# Patient Record
Sex: FEMALE | Race: WHITE | NOT HISPANIC OR LATINO | Employment: OTHER | URBAN - METROPOLITAN AREA
[De-identification: names, ages, dates, MRNs, and addresses within clinical notes are randomized per-mention and may not be internally consistent; named-entity substitution may affect disease eponyms.]

---

## 2017-04-13 ENCOUNTER — HOSPITAL ENCOUNTER (OUTPATIENT)
Dept: RADIOLOGY | Facility: CLINIC | Age: 82
Discharge: HOME | End: 2017-04-13
Payer: MEDICARE

## 2017-04-13 ENCOUNTER — ALLSCRIPTS OFFICE VISIT (OUTPATIENT)
Dept: OTHER | Facility: OTHER | Age: 82
End: 2017-04-13

## 2017-04-13 DIAGNOSIS — M25.519 PAIN IN SHOULDER: ICD-10-CM

## 2017-04-13 PROCEDURE — 73030 X-RAY EXAM OF SHOULDER: CPT

## 2017-07-13 ENCOUNTER — ALLSCRIPTS OFFICE VISIT (OUTPATIENT)
Dept: OTHER | Facility: OTHER | Age: 82
End: 2017-07-13

## 2018-01-12 VITALS
HEART RATE: 81 BPM | BODY MASS INDEX: 25.61 KG/M2 | DIASTOLIC BLOOD PRESSURE: 80 MMHG | SYSTOLIC BLOOD PRESSURE: 153 MMHG | HEIGHT: 64 IN | WEIGHT: 150 LBS

## 2018-01-13 VITALS
HEIGHT: 64 IN | DIASTOLIC BLOOD PRESSURE: 90 MMHG | HEART RATE: 87 BPM | BODY MASS INDEX: 24.45 KG/M2 | WEIGHT: 143.19 LBS | SYSTOLIC BLOOD PRESSURE: 153 MMHG

## 2019-06-14 ENCOUNTER — APPOINTMENT (EMERGENCY)
Dept: CT IMAGING | Facility: HOSPITAL | Age: 84
End: 2019-06-14
Payer: MEDICARE

## 2019-06-14 ENCOUNTER — APPOINTMENT (EMERGENCY)
Dept: RADIOLOGY | Facility: HOSPITAL | Age: 84
End: 2019-06-14
Payer: MEDICARE

## 2019-06-14 ENCOUNTER — HOSPITAL ENCOUNTER (EMERGENCY)
Facility: HOSPITAL | Age: 84
Discharge: HOME | End: 2019-06-14
Attending: EMERGENCY MEDICINE | Admitting: EMERGENCY MEDICINE
Payer: MEDICARE

## 2019-06-14 VITALS
HEIGHT: 64 IN | DIASTOLIC BLOOD PRESSURE: 80 MMHG | BODY MASS INDEX: 24.07 KG/M2 | RESPIRATION RATE: 17 BRPM | OXYGEN SATURATION: 94 % | HEART RATE: 87 BPM | TEMPERATURE: 98 F | WEIGHT: 141.01 LBS | SYSTOLIC BLOOD PRESSURE: 140 MMHG

## 2019-06-14 DIAGNOSIS — F03.90 DEMENTIA (HCC): ICD-10-CM

## 2019-06-14 DIAGNOSIS — R44.3 HALLUCINATIONS: Primary | ICD-10-CM

## 2019-06-14 LAB
ALBUMIN SERPL BCP-MCNC: 3.5 G/DL (ref 3.5–5)
ALP SERPL-CCNC: 65 U/L (ref 46–116)
ALT SERPL W P-5'-P-CCNC: 12 U/L (ref 12–78)
ANION GAP SERPL CALCULATED.3IONS-SCNC: 8 MMOL/L (ref 4–13)
AST SERPL W P-5'-P-CCNC: 21 U/L (ref 5–45)
BACTERIA UR QL AUTO: ABNORMAL /HPF
BASOPHILS # BLD AUTO: 0.04 THOUSANDS/ΜL (ref 0–0.1)
BASOPHILS NFR BLD AUTO: 0 % (ref 0–1)
BILIRUB SERPL-MCNC: 0.7 MG/DL (ref 0.2–1)
BILIRUB UR QL STRIP: NEGATIVE
BUN SERPL-MCNC: 16 MG/DL (ref 5–25)
CALCIUM SERPL-MCNC: 10.6 MG/DL (ref 8.3–10.1)
CHLORIDE SERPL-SCNC: 103 MMOL/L (ref 100–108)
CLARITY UR: CLEAR
CO2 SERPL-SCNC: 27 MMOL/L (ref 21–32)
COLOR UR: YELLOW
CREAT SERPL-MCNC: 0.91 MG/DL (ref 0.6–1.3)
EOSINOPHIL # BLD AUTO: 0.05 THOUSAND/ΜL (ref 0–0.61)
EOSINOPHIL NFR BLD AUTO: 1 % (ref 0–6)
ERYTHROCYTE [DISTWIDTH] IN BLOOD BY AUTOMATED COUNT: 14.4 % (ref 11.6–15.1)
GFR SERPL CREATININE-BSD FRML MDRD: 55 ML/MIN/1.73SQ M
GLUCOSE SERPL-MCNC: 127 MG/DL (ref 65–140)
GLUCOSE UR STRIP-MCNC: NEGATIVE MG/DL
HCT VFR BLD AUTO: 41.9 % (ref 34.8–46.1)
HGB BLD-MCNC: 13.5 G/DL (ref 11.5–15.4)
HGB UR QL STRIP.AUTO: NEGATIVE
IMM GRANULOCYTES # BLD AUTO: 0.05 THOUSAND/UL (ref 0–0.2)
IMM GRANULOCYTES NFR BLD AUTO: 1 % (ref 0–2)
KETONES UR STRIP-MCNC: NEGATIVE MG/DL
LEUKOCYTE ESTERASE UR QL STRIP: ABNORMAL
LYMPHOCYTES # BLD AUTO: 1.33 THOUSANDS/ΜL (ref 0.6–4.47)
LYMPHOCYTES NFR BLD AUTO: 13 % (ref 14–44)
MCH RBC QN AUTO: 31.3 PG (ref 26.8–34.3)
MCHC RBC AUTO-ENTMCNC: 32.2 G/DL (ref 31.4–37.4)
MCV RBC AUTO: 97 FL (ref 82–98)
MONOCYTES # BLD AUTO: 0.52 THOUSAND/ΜL (ref 0.17–1.22)
MONOCYTES NFR BLD AUTO: 5 % (ref 4–12)
NEUTROPHILS # BLD AUTO: 8.07 THOUSANDS/ΜL (ref 1.85–7.62)
NEUTS SEG NFR BLD AUTO: 80 % (ref 43–75)
NITRITE UR QL STRIP: NEGATIVE
NON-SQ EPI CELLS URNS QL MICRO: ABNORMAL /HPF
NRBC BLD AUTO-RTO: 0 /100 WBCS
PH UR STRIP.AUTO: 6 [PH]
PLATELET # BLD AUTO: 254 THOUSANDS/UL (ref 149–390)
PMV BLD AUTO: 10.7 FL (ref 8.9–12.7)
POTASSIUM SERPL-SCNC: 4.5 MMOL/L (ref 3.5–5.3)
PROT SERPL-MCNC: 7.2 G/DL (ref 6.4–8.2)
PROT UR STRIP-MCNC: NEGATIVE MG/DL
RBC # BLD AUTO: 4.32 MILLION/UL (ref 3.81–5.12)
RBC #/AREA URNS AUTO: ABNORMAL /HPF
SODIUM SERPL-SCNC: 138 MMOL/L (ref 136–145)
SP GR UR STRIP.AUTO: 1.01 (ref 1–1.03)
UROBILINOGEN UR QL STRIP.AUTO: 4 E.U./DL
WBC # BLD AUTO: 10.06 THOUSAND/UL (ref 4.31–10.16)
WBC #/AREA URNS AUTO: ABNORMAL /HPF

## 2019-06-14 PROCEDURE — 71045 X-RAY EXAM CHEST 1 VIEW: CPT

## 2019-06-14 PROCEDURE — 85025 COMPLETE CBC W/AUTO DIFF WBC: CPT | Performed by: EMERGENCY MEDICINE

## 2019-06-14 PROCEDURE — 80053 COMPREHEN METABOLIC PANEL: CPT | Performed by: EMERGENCY MEDICINE

## 2019-06-14 PROCEDURE — 99284 EMERGENCY DEPT VISIT MOD MDM: CPT | Performed by: EMERGENCY MEDICINE

## 2019-06-14 PROCEDURE — 96360 HYDRATION IV INFUSION INIT: CPT

## 2019-06-14 PROCEDURE — 36415 COLL VENOUS BLD VENIPUNCTURE: CPT | Performed by: EMERGENCY MEDICINE

## 2019-06-14 PROCEDURE — 70450 CT HEAD/BRAIN W/O DYE: CPT

## 2019-06-14 PROCEDURE — 99285 EMERGENCY DEPT VISIT HI MDM: CPT

## 2019-06-14 PROCEDURE — 81001 URINALYSIS AUTO W/SCOPE: CPT | Performed by: EMERGENCY MEDICINE

## 2019-06-14 RX ORDER — DONEPEZIL HYDROCHLORIDE 10 MG/1
10 TABLET, FILM COATED ORAL
COMMUNITY
End: 2020-09-22 | Stop reason: ALTCHOICE

## 2019-06-14 RX ORDER — MEMANTINE HYDROCHLORIDE 10 MG/1
10 TABLET ORAL 2 TIMES DAILY
COMMUNITY
End: 2021-01-08

## 2019-06-14 RX ORDER — ATORVASTATIN CALCIUM 20 MG/1
20 TABLET, FILM COATED ORAL DAILY
COMMUNITY
End: 2020-08-18 | Stop reason: ALTCHOICE

## 2019-06-14 RX ORDER — MELATONIN
2000 DAILY
COMMUNITY
End: 2020-08-18 | Stop reason: ALTCHOICE

## 2019-06-14 RX ORDER — RISPERIDONE 0.5 MG/1
0.5 TABLET, FILM COATED ORAL 2 TIMES DAILY
COMMUNITY
End: 2020-08-18 | Stop reason: ALTCHOICE

## 2019-06-14 RX ORDER — SODIUM CHLORIDE 9 MG/ML
1000 INJECTION, SOLUTION INTRAVENOUS ONCE
Status: COMPLETED | OUTPATIENT
Start: 2019-06-14 | End: 2019-06-14

## 2019-06-14 RX ORDER — AMLODIPINE BESYLATE AND BENAZEPRIL HYDROCHLORIDE 5; 20 MG/1; MG/1
1 CAPSULE ORAL DAILY
COMMUNITY

## 2019-06-14 RX ORDER — ASPIRIN 81 MG/1
81 TABLET, CHEWABLE ORAL DAILY
COMMUNITY
End: 2021-01-08

## 2019-06-14 RX ADMIN — SODIUM CHLORIDE 1000 ML/HR: 0.9 INJECTION, SOLUTION INTRAVENOUS at 10:20

## 2019-06-17 ENCOUNTER — TELEPHONE (OUTPATIENT)
Dept: NEUROLOGY | Facility: CLINIC | Age: 84
End: 2019-06-17

## 2019-06-19 ENCOUNTER — TELEPHONE (OUTPATIENT)
Dept: NEUROLOGY | Facility: CLINIC | Age: 84
End: 2019-06-19

## 2019-06-19 ENCOUNTER — TRANSCRIBE ORDERS (OUTPATIENT)
Dept: NEUROLOGY | Facility: CLINIC | Age: 84
End: 2019-06-19

## 2019-06-19 DIAGNOSIS — F02.80: Primary | ICD-10-CM

## 2019-07-28 ENCOUNTER — APPOINTMENT (EMERGENCY)
Dept: CT IMAGING | Facility: HOSPITAL | Age: 84
DRG: 884 | End: 2019-07-28
Payer: MEDICARE

## 2019-07-28 ENCOUNTER — APPOINTMENT (EMERGENCY)
Dept: RADIOLOGY | Facility: HOSPITAL | Age: 84
DRG: 884 | End: 2019-07-28
Payer: MEDICARE

## 2019-07-28 ENCOUNTER — HOSPITAL ENCOUNTER (INPATIENT)
Facility: HOSPITAL | Age: 84
LOS: 2 days | Discharge: NURSING, REHAB OR LONG-TERM CARE | DRG: 884 | End: 2019-07-31
Attending: EMERGENCY MEDICINE | Admitting: INTERNAL MEDICINE
Payer: MEDICARE

## 2019-07-28 DIAGNOSIS — S82.142A TIBIAL PLATEAU FRACTURE, LEFT: ICD-10-CM

## 2019-07-28 DIAGNOSIS — S09.90XA CLOSED HEAD INJURY, INITIAL ENCOUNTER: ICD-10-CM

## 2019-07-28 DIAGNOSIS — F03.90 DEMENTIA (HCC): ICD-10-CM

## 2019-07-28 DIAGNOSIS — S82.141A TIBIAL PLATEAU FRACTURE, RIGHT: ICD-10-CM

## 2019-07-28 DIAGNOSIS — N39.0 UTI (URINARY TRACT INFECTION): ICD-10-CM
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
## 2019-07-28 DIAGNOSIS — R44.3 HALLUCINATIONS: ICD-10-CM

## 2019-07-28 PROCEDURE — 99285 EMERGENCY DEPT VISIT HI MDM: CPT

## 2019-07-28 PROCEDURE — 72125 CT NECK SPINE W/O DYE: CPT

## 2019-07-28 PROCEDURE — 70450 CT HEAD/BRAIN W/O DYE: CPT

## 2019-07-28 PROCEDURE — 71046 X-RAY EXAM CHEST 2 VIEWS: CPT

## 2019-07-28 PROCEDURE — 73564 X-RAY EXAM KNEE 4 OR MORE: CPT

## 2019-07-28 PROCEDURE — 99285 EMERGENCY DEPT VISIT HI MDM: CPT | Performed by: EMERGENCY MEDICINE

## 2019-07-29 PROBLEM — E78.49 OTHER HYPERLIPIDEMIA: Status: ACTIVE | Noted: 2019-07-29

## 2019-07-29 PROBLEM — I10 ESSENTIAL HYPERTENSION: Status: ACTIVE | Noted: 2019-07-29

## 2019-07-29 PROBLEM — N32.81 OVERACTIVE BLADDER: Status: ACTIVE | Noted: 2019-07-29

## 2019-07-29 PROBLEM — S82.142A TIBIAL PLATEAU FRACTURE, LEFT: Status: ACTIVE | Noted: 2019-07-29

## 2019-07-29 PROBLEM — R26.2 AMBULATORY DYSFUNCTION: Status: ACTIVE | Noted: 2019-07-29

## 2019-07-29 PROBLEM — F03.90 DEMENTIA (HCC): Status: ACTIVE | Noted: 2019-07-29

## 2019-07-29 LAB
ALBUMIN SERPL BCP-MCNC: 3.6 G/DL (ref 3.5–5)
ALP SERPL-CCNC: 63 U/L (ref 46–116)
ALT SERPL W P-5'-P-CCNC: 8 U/L (ref 12–78)
ANION GAP SERPL CALCULATED.3IONS-SCNC: 7 MMOL/L (ref 4–13)
AST SERPL W P-5'-P-CCNC: 14 U/L (ref 5–45)
BACTERIA UR QL AUTO: ABNORMAL /HPF
BASOPHILS # BLD AUTO: 0.06 THOUSANDS/ΜL (ref 0–0.1)
BASOPHILS NFR BLD AUTO: 1 % (ref 0–1)
BILIRUB SERPL-MCNC: 0.4 MG/DL (ref 0.2–1)
BILIRUB UR QL STRIP: NEGATIVE
BUN SERPL-MCNC: 21 MG/DL (ref 5–25)
CALCIUM SERPL-MCNC: 11.2 MG/DL (ref 8.3–10.1)
CHLORIDE SERPL-SCNC: 105 MMOL/L (ref 100–108)
CHOLEST SERPL-MCNC: 131 MG/DL (ref 50–200)
CLARITY UR: CLEAR
CO2 SERPL-SCNC: 29 MMOL/L (ref 21–32)
COLOR UR: YELLOW
CREAT SERPL-MCNC: 1.11 MG/DL (ref 0.6–1.3)
EOSINOPHIL # BLD AUTO: 0.19 THOUSAND/ΜL (ref 0–0.61)
EOSINOPHIL NFR BLD AUTO: 2 % (ref 0–6)
ERYTHROCYTE [DISTWIDTH] IN BLOOD BY AUTOMATED COUNT: 14.4 % (ref 11.6–15.1)
GFR SERPL CREATININE-BSD FRML MDRD: 44 ML/MIN/1.73SQ M
GLUCOSE SERPL-MCNC: 120 MG/DL (ref 65–140)
GLUCOSE UR STRIP-MCNC: NEGATIVE MG/DL
HCT VFR BLD AUTO: 41.2 % (ref 34.8–46.1)
HDLC SERPL-MCNC: 57 MG/DL (ref 40–60)
HGB BLD-MCNC: 13.3 G/DL (ref 11.5–15.4)
HGB UR QL STRIP.AUTO: NEGATIVE
HYALINE CASTS #/AREA URNS LPF: ABNORMAL /LPF
IMM GRANULOCYTES # BLD AUTO: 0.03 THOUSAND/UL (ref 0–0.2)
IMM GRANULOCYTES NFR BLD AUTO: 0 % (ref 0–2)
KETONES UR STRIP-MCNC: NEGATIVE MG/DL
LDLC SERPL CALC-MCNC: 61 MG/DL (ref 0–100)
LEUKOCYTE ESTERASE UR QL STRIP: ABNORMAL
LYMPHOCYTES # BLD AUTO: 2.08 THOUSANDS/ΜL (ref 0.6–4.47)
LYMPHOCYTES NFR BLD AUTO: 21 % (ref 14–44)
MCH RBC QN AUTO: 31.4 PG (ref 26.8–34.3)
MCHC RBC AUTO-ENTMCNC: 32.3 G/DL (ref 31.4–37.4)
MCV RBC AUTO: 97 FL (ref 82–98)
MONOCYTES # BLD AUTO: 0.85 THOUSAND/ΜL (ref 0.17–1.22)
MONOCYTES NFR BLD AUTO: 9 % (ref 4–12)
NEUTROPHILS # BLD AUTO: 6.58 THOUSANDS/ΜL (ref 1.85–7.62)
NEUTS SEG NFR BLD AUTO: 67 % (ref 43–75)
NITRITE UR QL STRIP: NEGATIVE
NON-SQ EPI CELLS URNS QL MICRO: ABNORMAL /HPF
NONHDLC SERPL-MCNC: 74 MG/DL
NRBC BLD AUTO-RTO: 0 /100 WBCS
PH UR STRIP.AUTO: 5.5 [PH]
PLATELET # BLD AUTO: 252 THOUSANDS/UL (ref 149–390)
PMV BLD AUTO: 10.6 FL (ref 8.9–12.7)
POTASSIUM SERPL-SCNC: 4.4 MMOL/L (ref 3.5–5.3)
PROT SERPL-MCNC: 7.2 G/DL (ref 6.4–8.2)
PROT UR STRIP-MCNC: ABNORMAL MG/DL
RBC # BLD AUTO: 4.24 MILLION/UL (ref 3.81–5.12)
RBC #/AREA URNS AUTO: ABNORMAL /HPF
SODIUM SERPL-SCNC: 141 MMOL/L (ref 136–145)
SP GR UR STRIP.AUTO: >=1.03 (ref 1–1.03)
TRIGL SERPL-MCNC: 64 MG/DL
UROBILINOGEN UR QL STRIP.AUTO: 1 E.U./DL
WBC # BLD AUTO: 9.79 THOUSAND/UL (ref 4.31–10.16)
WBC #/AREA URNS AUTO: ABNORMAL /HPF

## 2019-07-29 PROCEDURE — 97163 PT EVAL HIGH COMPLEX 45 MIN: CPT

## 2019-07-29 PROCEDURE — 99222 1ST HOSP IP/OBS MODERATE 55: CPT | Performed by: ORTHOPAEDIC SURGERY

## 2019-07-29 PROCEDURE — G8987 SELF CARE CURRENT STATUS: HCPCS

## 2019-07-29 PROCEDURE — 97167 OT EVAL HIGH COMPLEX 60 MIN: CPT

## 2019-07-29 PROCEDURE — G8978 MOBILITY CURRENT STATUS: HCPCS

## 2019-07-29 PROCEDURE — 81001 URINALYSIS AUTO W/SCOPE: CPT | Performed by: EMERGENCY MEDICINE

## 2019-07-29 PROCEDURE — G8979 MOBILITY GOAL STATUS: HCPCS

## 2019-07-29 PROCEDURE — G8988 SELF CARE GOAL STATUS: HCPCS

## 2019-07-29 PROCEDURE — 85025 COMPLETE CBC W/AUTO DIFF WBC: CPT | Performed by: EMERGENCY MEDICINE

## 2019-07-29 PROCEDURE — 80053 COMPREHEN METABOLIC PANEL: CPT | Performed by: EMERGENCY MEDICINE

## 2019-07-29 PROCEDURE — 80061 LIPID PANEL: CPT | Performed by: PHYSICIAN ASSISTANT

## 2019-07-29 PROCEDURE — 99220 PR INITIAL OBSERVATION CARE/DAY 70 MINUTES: CPT | Performed by: INTERNAL MEDICINE

## 2019-07-29 PROCEDURE — 36415 COLL VENOUS BLD VENIPUNCTURE: CPT | Performed by: EMERGENCY MEDICINE

## 2019-07-29 RX ORDER — LISINOPRIL 20 MG/1
20 TABLET ORAL DAILY
Status: DISCONTINUED | OUTPATIENT
Start: 2019-07-29 | End: 2019-07-31 | Stop reason: HOSPADM

## 2019-07-29 RX ORDER — ACETAMINOPHEN 325 MG/1
650 TABLET ORAL EVERY 6 HOURS PRN
Status: DISCONTINUED | OUTPATIENT
Start: 2019-07-29 | End: 2019-07-31 | Stop reason: HOSPADM

## 2019-07-29 RX ORDER — MEMANTINE HYDROCHLORIDE 10 MG/1
10 TABLET ORAL 2 TIMES DAILY
Status: DISCONTINUED | OUTPATIENT
Start: 2019-07-29 | End: 2019-07-31 | Stop reason: HOSPADM

## 2019-07-29 RX ORDER — DONEPEZIL HYDROCHLORIDE 5 MG/1
10 TABLET, FILM COATED ORAL
Status: DISCONTINUED | OUTPATIENT
Start: 2019-07-29 | End: 2019-07-31 | Stop reason: HOSPADM

## 2019-07-29 RX ORDER — MELATONIN
2000 DAILY
Status: DISCONTINUED | OUTPATIENT
Start: 2019-07-29 | End: 2019-07-31 | Stop reason: HOSPADM

## 2019-07-29 RX ORDER — DULOXETIN HYDROCHLORIDE 60 MG/1
60 CAPSULE, DELAYED RELEASE ORAL DAILY
Status: DISCONTINUED | OUTPATIENT
Start: 2019-07-29 | End: 2019-07-31 | Stop reason: HOSPADM

## 2019-07-29 RX ORDER — DULOXETIN HYDROCHLORIDE 60 MG/1
20 CAPSULE, DELAYED RELEASE ORAL DAILY
COMMUNITY
End: 2020-10-07 | Stop reason: ALTCHOICE

## 2019-07-29 RX ORDER — MAGNESIUM HYDROXIDE/ALUMINUM HYDROXICE/SIMETHICONE 120; 1200; 1200 MG/30ML; MG/30ML; MG/30ML
30 SUSPENSION ORAL EVERY 6 HOURS PRN
Status: DISCONTINUED | OUTPATIENT
Start: 2019-07-29 | End: 2019-07-31 | Stop reason: HOSPADM

## 2019-07-29 RX ORDER — ASPIRIN 81 MG/1
81 TABLET, CHEWABLE ORAL DAILY
Status: DISCONTINUED | OUTPATIENT
Start: 2019-07-29 | End: 2019-07-31 | Stop reason: HOSPADM

## 2019-07-29 RX ORDER — OXYBUTYNIN CHLORIDE 5 MG/1
10 TABLET, EXTENDED RELEASE ORAL DAILY
Status: DISCONTINUED | OUTPATIENT
Start: 2019-07-29 | End: 2019-07-31 | Stop reason: HOSPADM

## 2019-07-29 RX ORDER — ONDANSETRON 2 MG/ML
4 INJECTION INTRAMUSCULAR; INTRAVENOUS EVERY 6 HOURS PRN
Status: DISCONTINUED | OUTPATIENT
Start: 2019-07-29 | End: 2019-07-31 | Stop reason: HOSPADM

## 2019-07-29 RX ORDER — RISPERIDONE 0.25 MG/1
0.5 TABLET, FILM COATED ORAL 2 TIMES DAILY
Status: DISCONTINUED | OUTPATIENT
Start: 2019-07-29 | End: 2019-07-31 | Stop reason: HOSPADM

## 2019-07-29 RX ORDER — AMLODIPINE BESYLATE 5 MG/1
5 TABLET ORAL DAILY
Status: DISCONTINUED | OUTPATIENT
Start: 2019-07-29 | End: 2019-07-31 | Stop reason: HOSPADM

## 2019-07-29 RX ORDER — ATORVASTATIN CALCIUM 20 MG/1
20 TABLET, FILM COATED ORAL DAILY
Status: DISCONTINUED | OUTPATIENT
Start: 2019-07-29 | End: 2019-07-31 | Stop reason: HOSPADM

## 2019-07-29 RX ORDER — POLYETHYLENE GLYCOL 3350 17 G/17G
17 POWDER, FOR SOLUTION ORAL DAILY
Status: DISCONTINUED | OUTPATIENT
Start: 2019-07-29 | End: 2019-07-31 | Stop reason: HOSPADM

## 2019-07-29 RX ADMIN — MEMANTINE 10 MG: 10 TABLET ORAL at 09:29

## 2019-07-29 RX ADMIN — ENOXAPARIN SODIUM 30 MG: 30 INJECTION SUBCUTANEOUS at 09:29

## 2019-07-29 RX ADMIN — RISPERIDONE 0.5 MG: 1 TABLET ORAL at 19:37

## 2019-07-29 RX ADMIN — ATORVASTATIN CALCIUM 20 MG: 40 TABLET, FILM COATED ORAL at 09:29

## 2019-07-29 RX ADMIN — DULOXETINE HYDROCHLORIDE 60 MG: 60 CAPSULE, DELAYED RELEASE ORAL at 09:29

## 2019-07-29 RX ADMIN — CEFTRIAXONE SODIUM 1000 MG: 10 INJECTION, POWDER, FOR SOLUTION INTRAVENOUS at 01:44

## 2019-07-29 RX ADMIN — LISINOPRIL 20 MG: 20 TABLET ORAL at 09:29

## 2019-07-29 RX ADMIN — AMLODIPINE BESYLATE 5 MG: 5 TABLET ORAL at 09:29

## 2019-07-29 RX ADMIN — VITAMIN D, TAB 1000IU (100/BT) 2000 UNITS: 25 TAB at 09:29

## 2019-07-29 RX ADMIN — ASPIRIN 81 MG 81 MG: 81 TABLET ORAL at 09:29

## 2019-07-29 RX ADMIN — MEMANTINE 10 MG: 10 TABLET ORAL at 19:37

## 2019-07-29 RX ADMIN — RISPERIDONE 0.5 MG: 1 TABLET ORAL at 09:37

## 2019-07-29 RX ADMIN — OXYBUTYNIN CHLORIDE 10 MG: 5 TABLET, EXTENDED RELEASE ORAL at 09:29

## 2019-07-29 RX ADMIN — DONEPEZIL HYDROCHLORIDE 10 MG: 5 TABLET ORAL at 22:26

## 2019-07-29 NOTE — OCCUPATIONAL THERAPY NOTE
Occupational Therapy Evaluation      Ewelina Quiroga    7/29/2019    Patient Active Problem List   Diagnosis    Ambulatory dysfunction    Dementia    Essential hypertension    Other hyperlipidemia    Overactive bladder    Tibial plateau fracture, left       Past Medical History:   Diagnosis Date    Dementia     Hyperlipidemia     Hypertension        Past Surgical History:   Procedure Laterality Date    HYSTERECTOMY        07/29/19 1008   Note Type   Note type Eval/Treat   Restrictions/Precautions   Weight Bearing Precautions Per Order Yes  (will maintain NWB until cleared by MD given XR findings)   LLE Weight Bearing Per Order NWB  (until cleared by MD)   Other Precautions Cognitive; Bed Alarm;O2;Fall Risk   Pain Assessment   Pain Assessment No/denies pain   Pain Score No Pain   Home Living   Type of [address] [facility] Two level  (pt unsure if she lives upstairs/downstairs)   Bathroom Shower/Tub   (pt unable to provide information)   Home Equipment   (pt unable to recall if she uses any AD at baseline)   Additional Comments pt questionable historian given cognitive impairments, CM to follow up   Prior Function   Level of [place] Independent with ADLs and functional mobility  (per pt)   Lives With Daughter  (per pt - per CR dtr works during the day)   [place]  (per pt)   IADLs Needs assistance   Falls in the last 6 months 1 to 4  (pt denying any injuries from falls)   Comments pt reports "right now I'm living with my daughter"  (-) driving, pt reports dtr does cooking   Lifestyle   Autonomy Pt was a questionable historian due to significant dementia     Reciprocal Relationships dtr   Psychosocial   Psychosocial (WDL) WDL   ADL   Eating Assistance 4  Minimal Assistance   Eating Deficit Increased time to complete;Setup   Grooming Assistance 4  Minimal Assistance   Grooming Deficit Increased time to complete;Supervision/safety;Verbal cueing;Steadying;Setup UB Bathing Assistance 4  Minimal Assistance   UB Bathing Deficit Increased time to complete;Supervision/safety;Verbal cueing;Setup;Steadying   LB Bathing Assistance 3  Moderate Assistance   LB Bathing Deficit Increased time to complete;Supervision/safety;Verbal cueing;Steadying;Setup   UB Dressing Assistance 4  Minimal Assistance   UB Dressing Deficit Increased time to complete;Supervision/safety;Verbal cueing;Setup   LB Dressing Assistance 3  Moderate Assistance   LB Dressing Deficit Supervision/safety; Increased time to complete;Verbal cueing;Steadying;Setup   Toileting Assistance  3  Moderate Assistance   Toileting Deficit Increased time to complete;Supervison/safety;Verbal cueing;Steadying;Setup   Functional Assistance 3  Moderate Assistance   Functional Deficit Increased time to complete;Supervision/safety;Verbal cueing;Steadying;Setup   Bed Mobility   Rolling R 4  Minimal assistance   Additional items Assist x 1;HOB elevated; Increased time required;Verbal cues; Bedrails  (to position posey alarm pad under pt)   Rolling L 4  Minimal assistance   Additional items Assist x 1;HOB elevated; Increased time required;Verbal cues; Bedrails   Supine to Sit Unable to assess  (not appropriate at this time)   Additional Comments OT/PT observed pt spontaneously moving B LEs in bed upon arrival, pt denying any pain to L knee  Reports "cramping" pain to R calf  Therapy instructed pt to limit movement to L knee, verbal cues required to maintain  Mobility deferred at this time 2* XR findings of L knee "Linear radiodensity within the central and lateral proximal tibia raising suspicion for possible impaction fracture within the plateau  No evidence for hemarthrosis demonstrated  Soft tissue swelling noted medially "   Transfers   Sit to Stand Unable to assess   Stand to Sit Unable to assess   Additional Comments Mobility assessment deferred at this time 2* L knee XR findings suspicious for fracture   PT/OT discussed with RN Lucien Serrano, pt to remain bedrest, NWB, recommend ortho consult  Activity Tolerance   Activity Tolerance Patient limited by fatigue; Other (Comment)  (cognition)   Medical Staff Made Aware TigerText from Romaine Peace CM asking PT/OT assess pt- priority for d/c  PT Chacha Sanchez performed co-eval  OT discussed case with Dr Lawrence Minor via TigerText telephone call  Therapy updated CM Student regarding session limitiations and recommendations  Nurse Made Aware ALLEGRA Serrano verbalized pt appropriate to see, made aware of session outcome/recs   RUE Assessment   RUE Assessment WFL   LUE Assessment   LUE Assessment WFL   Hand Function   Gross Motor Coordination Functional   Fine Motor Coordination Functional   Sensation   Light Touch Not tested   Sharp/Dull Not tested   Stereognosis Not tested   Additional Comments Sensation not tested due to cognition   Cognition   Overall Cognitive Status Impaired   Arousal/Participation Alert; Cooperative   Attention Difficulty dividing attention   Orientation Level Oriented to person;Disoriented to place; Disoriented to situation;Disoriented to time   Memory Decreased recall of biographical information;Decreased short term memory;Decreased recall of recent events;Decreased recall of precautions   Following Commands Follows one step commands with increased time or repetition   Comments Pt was agreeable to OT eval   Assessment   Limitation Decreased ADL status; Decreased self-care trans;Decreased high-level ADLs; Decreased UE strength;Decreased endurance;Mood limitation;Decreased Safe judgement during ADL;Decreased cognition   Prognosis Fair   Assessment Pt is a 80 y o  female seen for OT evaluation s/p admit to [facility] on 7/28/2019 w/ Ambulatory dysfunction  Comorbidities affecting pt's functional performance at time of assessment include:dementia, HTN, left tibial plateau fracture, and overactive bladder   Orders placed for OT evaluation and treatment and "up with A" order    Performed at least two patient identifiers during session including name and wristband  Personal factors affecting pt at time of IE include:limited home support, behavioral pattern, difficulty performing ADLS, difficulty performing IADLS , limited insight into deficits, decreased initiation and engagement , financial barriers, health management  and environment  Prior to admission, pt was a poor historian but likely required A with ADLs, A with IADLs, and (-) driving  Upon evaluation: Pt requires min A with UB ADLs, mod A with LB ADLs, and deferred xfers/ functional mobility  Limitations 2* the following deficits impacting occupational performance: weakness, decreased dynamic sit/ stand balance, decreased activity tolerance, decreased standing tolerance time for self care and functional mobility, impaired attention, impaired initiation, impaired memory, impaired sequencing, impaired problem solving, impulsivity, decreased safety awareness, orthopedic restrictions, impaired interpersonal skills, environmental deficits, decreased coping skills, decreased mobilty and requiring external assistance to complete transitional movements  Pt to benefit from continued skilled OT tx while in the hospital to address deficits as defined above and maximize level of functional independence w ADL's and functional mobility  Occupational Performance areas to address include: bathing/shower, toilet hygiene, dressing, medication management, socialization, health maintenance, functional mobility, community mobility, clothing management, household maintenance and job performance/volunteering  From OT standpoint, recommendation at time of d/c would be STR  Plan   Treatment Interventions ADL retraining;Functional transfer training;UE strengthening/ROM; Endurance training; Activityengagement; Energy conservation;Cardiac education;Continued evaluation; Compensatory technique education;Equipment evaluation/education;Patient/family training   Goal Expiration Date 08/11/19   OT Frequency 3-5x/wk   Recommendation   OT Discharge Recommendation Short Term Rehab   OT - OK to Discharge No   Barthel Index   Feeding 5   Bathing 0   Grooming Score 0   Dressing Score 5   Bladder Score 5   Bowels Score 5   Toilet Use Score 5   Transfers (Bed/Chair) Score 0   Mobility (Level Surface) Score 0   Stairs Score 0   Barthel Index Score 25   Modified Hollie Scale   Modified Hollie Scale 5     Occupational Therapy goals: In 7-14 days:    1 - Patient will verbalize and demonstrate use of energy conservation/ deep breathing technique and work simplification skills during functional activity with no verbal cues  2 - Patient will verbalize and demonstrate good body mechanics and joint protection techniques during  ADLs/ IADLs with no verbal cues  3 - Patient will increase OOB/ sitting tolerance to 2-4 hours per day for increased participation in self care and leisure tasks with no s/s of exertion  4 - Patient will increase standing tolerance time to 5  minutes with unilateral UE support to complete sink level ADLs@ mod I level  5 - Patient will increase sitting tolerance at edge of bed to 20 minutes to complete UB ADLs @ set up assist level  6 - Patient will transfer bed to Chair / toilet at Set up assist level with AD as indicated  7 - Patient will complete UB ADLs with set up assist     8 - Patient will complete LB ADLs with min assist with the use of adaptive equipment  9 - Patient will complete toileting hygiene with set up assist/ supervision for thoroughness      10 - Patient/ Family  will demonstrate competency with [facility]

## 2019-07-29 NOTE — ASSESSMENT & PLAN NOTE
· BP adequately controlled on current regimen  · Continue home dose Lotrel  · Monitor BP per unit protocol

## 2019-07-29 NOTE — ED PROVIDER NOTES
Pt Name: Saint Stagger  MRN: [id number]  [place] 4/20/1928  Age/Sex: 80 y o  female  Date of evaluation: 7/28/2019  PCP: David Lopez    Chief Complaint   Patient presents with   Mahin Parsons     PT presents to ED via EMS with report of a fall this evening  Pt reports she did hit her head, No thinners and denies LOC  Daughter further reports pt has been unsteady on her feet lately and began hallucinating last night stating she is seeing people and hearing them talking  HPI    80 y o  female presenting with fall and hallucinations  Per daughter, patient has dementia and things been worsening over the past month  She notes multiple falls, most recent of which was immediately prior to arrival, states that her mother was changing her room and fell, states that she said that she hit her head although she has since began denying that  Patient's daughter states she was on the other side of the door and heard her fall, found her awake immediately afterwards, with no loss of consciousness  Patient complains of mild headache but denies any other symptoms at this time  Daughter also notes the patient has become increasingly shaking has had multiple falls over the past few weeks, also notes the patient seems to be hallucinating more frequently, frequently seeing people or things in the room that are not there  Daughter expresses some concern regarding her mother safety in her house, states that she has to go to work during most the day and feels she is not able to watch her 24/7 to keep her safe  She notes recent adjustment of medications 5 3 weeks ago with increased dose of risperidone without improvement of hallucinations  HPI      Past Medical and Surgical History    Past Medical History:   Diagnosis Date    Dementia     Hyperlipidemia     Hypertension        Past Surgical History:   Procedure Laterality Date    HYSTERECTOMY         Family History   Family history unknown:  Yes Social History     Tobacco Use    Smoking status: Never Smoker    Smokeless tobacco: Never Used   Substance Use Topics    Alcohol use: Never     Frequency: Never    Drug use: Never           Allergies    No Known Allergies    Home Medications    Prior to Admission medications    Medication Sig Start Date End Date Taking? Authorizing Provider   amLODIPine-benazepril (LOTREL 5-20) 5-20 MG per capsule Take 1 capsule by mouth daily    Historical Provider, MD   aspirin 81 mg chewable tablet Chew 81 mg daily    Historical Provider, MD   atorvastatin (LIPITOR) 20 mg tablet Take 20 mg by mouth daily    Historical Provider, MD   cholecalciferol (VITAMIN D3) 1,000 units tablet Take 2,000 Units by mouth daily    Historical Provider, MD   donepezil (ARICEPT) 10 mg tablet Take 10 mg by mouth daily at bedtime    Historical Provider, MD   memantine (NAMENDA) 10 mg tablet Take 10 mg by mouth 2 (two) times a day    Historical Provider, MD   risperiDONE (RisperDAL) 0 5 mg tablet Take 0 5 mg by mouth 2 (two) times a day    Historical Provider, MD           Review of Systems    Review of Systems   Constitutional: Negative for activity change, chills and fever  HENT: Negative for drooling and facial swelling  Eyes: Negative for pain, discharge and visual disturbance  Respiratory: Negative for apnea, cough, chest tightness, shortness of breath and wheezing  Cardiovascular: Negative for chest pain and leg swelling  Gastrointestinal: Negative for abdominal pain, constipation, diarrhea, nausea and vomiting  Genitourinary: Negative for difficulty urinating, dysuria and urgency  Musculoskeletal: Negative for arthralgias, back pain and gait problem  Skin: Negative for color change and rash  Neurological: Positive for headaches  Negative for dizziness, speech difficulty and weakness  Psychiatric/Behavioral: Positive for confusion and hallucinations  Negative for agitation and behavioral problems             All other systems reviewed and negative  Physical Exam      ED Triage Vitals   Temperature Pulse Respirations Blood Pressure SpO2   07/28/19 2246 07/28/19 2246 07/28/19 2246 07/28/19 2246 07/28/19 2246   98 7 °F (37 1 °C) 83 18 119/68 95 %      Temp Source Heart Rate Source Patient Position - Orthostatic VS BP Location FiO2 (%)   07/28/19 2246 07/28/19 2246 07/29/19 0247 07/28/19 2246 --   Oral Monitor Lying - Orthostatic VS Right arm       Pain Score       07/28/19 2246       No Pain               Physical Exam   Constitutional: She is oriented to person, place, and time  She appears well-developed and well-nourished  HENT:   Head: Normocephalic and atraumatic  Nose: Nose normal    Mouth/Throat: Oropharynx is clear and moist    Eyes: Pupils are equal, round, and reactive to light  Conjunctivae and EOM are normal    Neck: Normal range of motion  Neck supple  Cardiovascular: Normal rate, regular rhythm, normal heart sounds and intact distal pulses  Pulmonary/Chest: Effort normal and breath sounds normal  No respiratory distress  She has no wheezes  She has no rales  Abdominal: Soft  She exhibits no distension  There is no tenderness  There is no rebound and no guarding  Musculoskeletal: Normal range of motion  She exhibits no edema or deformity  Neurological: She is alert and oriented to person, place, and time  She displays normal reflexes  No cranial nerve deficit or sensory deficit  She exhibits normal muscle tone  Coordination normal    Cranial nerves 2-12 intact, 5/5 strength in all extremities, no pronator drift, normal coordination  Skin: Skin is warm and dry  No rash noted  No erythema  Psychiatric: She has a normal mood and affect  Her behavior is normal  Judgment and thought content normal    Nursing note and vitals reviewed             Diagnostic Results      Labs:    Results for orders placed or performed during the hospital encounter of 07/28/19   CBC and differential   Result Value Ref Range    WBC 9 79 4 31 - 10 16 Thousand/uL    RBC 4 24 3 81 - 5 12 Million/uL    Hemoglobin 13 3 11 5 - 15 4 g/dL    Hematocrit 41 2 34 8 - 46 1 %    MCV 97 82 - 98 fL    MCH 31 4 26 8 - 34 3 pg    MCHC 32 3 31 4 - 37 4 g/dL    RDW 14 4 11 6 - 15 1 %    MPV 10 6 8 9 - 12 7 fL    Platelets 180 994 - 188 Thousands/uL    nRBC 0 /100 WBCs    Neutrophils Relative 67 43 - 75 %    Immat GRANS % 0 0 - 2 %    Lymphocytes Relative 21 14 - 44 %    Monocytes Relative 9 4 - 12 %    Eosinophils Relative 2 0 - 6 %    Basophils Relative 1 0 - 1 %    Neutrophils Absolute 6 58 1 85 - 7 62 Thousands/µL    Immature Grans Absolute 0 03 0 00 - 0 20 Thousand/uL    Lymphocytes Absolute 2 08 0 60 - 4 47 Thousands/µL    Monocytes Absolute 0 85 0 17 - 1 22 Thousand/µL    Eosinophils Absolute 0 19 0 00 - 0 61 Thousand/µL    Basophils Absolute 0 06 0 00 - 0 10 Thousands/µL   Comprehensive metabolic panel   Result Value Ref Range    Sodium 141 136 - 145 mmol/L    Potassium 4 4 3 5 - 5 3 mmol/L    Chloride 105 100 - 108 mmol/L    CO2 29 21 - 32 mmol/L    ANION GAP 7 4 - 13 mmol/L    BUN 21 5 - 25 mg/dL    Creatinine 1 11 0 60 - 1 30 mg/dL    Glucose 120 65 - 140 mg/dL    Calcium 11 2 (H) 8 3 - 10 1 mg/dL    AST 14 5 - 45 U/L    ALT 8 (L) 12 - 78 U/L    Alkaline Phosphatase 63 46 - 116 U/L    Total Protein 7 2 6 4 - 8 2 g/dL    Albumin 3 6 3 5 - 5 0 g/dL    Total Bilirubin 0 40 0 20 - 1 00 mg/dL    eGFR 44 ml/min/1 73sq m   UA w Reflex to Microscopic w Reflex to Culture   Result Value Ref Range    Color, UA Yellow     Clarity, UA Clear     Specific Gravity, UA >=1 030 1 003 - 1 030    pH, UA 5 5 4 5, 5 0, 5 5, 6 0, 6 5, 7 0, 7 5, 8 0    Leukocytes, UA Trace (A) Negative    Nitrite, UA Negative Negative    Protein, UA Trace (A) Negative mg/dl    Glucose, UA Negative Negative mg/dl    Ketones, UA Negative Negative mg/dl    Urobilinogen, UA 1 0 0 2, 1 0 E U /dl E U /dl    Bilirubin, UA Negative Negative    Blood, UA Negative Negative   Urine Microscopic   Result Value Ref Range    RBC, UA None Seen None Seen, 0-5 /hpf    WBC, UA 2-4 (A) None Seen, 0-5, 5-55, 5-65 /hpf    Epithelial Cells Occasional None Seen, Occasional /hpf    Bacteria, UA Occasional None Seen, Occasional /hpf    Hyaline Casts, UA 2-4 (A) (none) /lpf   Lipid panel   Result Value Ref Range    Cholesterol 131 50 - 200 mg/dL    Triglycerides 64 <=150 mg/dL    HDL, Direct 57 40 - 60 mg/dL    LDL Calculated 61 0 - 100 mg/dL    Non-HDL-Chol (CHOL-HDL) 74 mg/dl       All labs reviewed and utilized in the medical decision making process    Radiology:    XR knee 4+ views left injury   ED Interpretation   No acute fracture or dislocation      XR chest 2 views   ED Interpretation   No acute disease      CT head without contrast   Final Result      No acute intracranial abnormality  Microangiopathic changes  Workstation performed: [id number]         CT spine cervical without contrast   Final Result      No cervical spine fracture or traumatic malalignment  Large retrodens pannus as well as some erosive change at the dens, which may be seen with inflammatory arthropathy such as rheumatoid arthritis                     Workstation performed: [id number]             All radiology studies independently viewed by me and interpreted by the radiologist     Procedure    Procedures        ED Course of Care and Re-Assessments      Started on ceftriaxone for UTI as potential contributor of recent decline in mental status    Medications   aspirin chewable tablet 81 mg (has no administration in time range)   atorvastatin (LIPITOR) tablet 20 mg (has no administration in time range)   cholecalciferol (VITAMIN D3) tablet 2,000 Units (has no administration in time range)   donepezil (ARICEPT) tablet 10 mg (has no administration in time range)   DULoxetine (CYMBALTA) delayed release capsule 60 mg (has no administration in time range)   memantine (NAMENDA) tablet 10 mg (has no administration in time range)   oxybutynin (DITROPAN-XL) 24 hr tablet 10 mg (has no administration in time range)   risperiDONE (RisperDAL) tablet 0 5 mg (has no administration in time range)   polyethylene glycol (MIRALAX) packet 17 g (has no administration in time range)   magnesium hydroxide (MILK OF MAGNESIA) 400 mg/5 mL oral suspension 30 mL (has no administration in time range)   ondansetron (ZOFRAN) injection 4 mg (has no administration in time range)   aluminum-magnesium hydroxide-simethicone (MYLANTA) 200-200-20 mg/5 mL oral suspension 30 mL (has no administration in time range)   acetaminophen (TYLENOL) tablet 650 mg (has no administration in time range)   enoxaparin (LOVENOX) subcutaneous injection 30 mg (has no administration in time range)   amLODIPine (NORVASC) tablet 5 mg (has no administration in time range)     And   lisinopril (ZESTRIL) tablet 20 mg (has no administration in time range)   ceftriaxone (ROCEPHIN) 1 g/50 mL in dextrose IVPB (0 mg Intravenous Stopped 7/29/19 0236)           FINAL IMPRESSION    Final diagnoses:   Fall, initial encounter   Closed head injury, initial encounter   Hallucinations   Dementia   UTI (urinary tract infection)         DISPOSITION/PLAN    Presentation as above with declining mental status as well as most recent a series of falls  Acute trauma workup negative no significant injuries found, labs remarkable for urinalysis concerning for possible UTI which could be affecting declining mental status  Patient does not appear to be safe in her current living situation based on history from daughter, citing worsening mental status, multiple recent falls, increasing unsteadiness  Admitted Internal Medicine for further care and to facilitate placement, hemodynamically stable and comfortable at that time    Time reflects when diagnosis was documented in both MDM as applicable and the Disposition within this note     Time User Action Codes Description Comment    7/29/2019  1:38 AM Emerson Blades Add [C95  IXBK] Fall, initial encounter     7/29/2019  1:38 AM Karina Muzzy T Add [S09 90XA] Closed head injury, initial encounter     7/29/2019  1:38 AM Karina Muzzy T Add [R44 3] Hallucinations     7/29/2019  1:38 AM Karina Muzzy T Add [F03 90] Dementia     7/29/2019  1:38 AM Karina Muzzy T Add [N39 0] UTI (urinary tract infection)       ED Disposition     ED Disposition Condition Date/Time Comment    Admit Stable Mon Jul 29, 2019  1:38 AM Case was discussed with CATARINA and the patient's admission status was agreed to be Admission Status: inpatient status to the service of Dr Pantera Garcia   Follow-up Information    None           PATIENT REFERRED TO:    No follow-up provider specified  DISCHARGE MEDICATIONS:    Patient's Medications   Discharge Prescriptions    No medications on file       No discharge procedures on file           MD Sotero Odell MD  07/29/19 0922

## 2019-07-29 NOTE — ED NOTES
1  CC: patient had fall at home and hit her head, denies LOC, no blood thinners  2  OS: alert and oriented x 4  3  Abnormal labs/vitals, focused assessment:   4  Medications/drips: 0  5  Narcotic time/pain: denies pain   6  IV lines/drains/etc: 20 gauge on left AC  7  Isolation status: standard  8  Skin: unable to assess   9  Ambulation status: unable to assess  10  Trauma: yes  11   Phone number:  [phone]     Tonny Ruggiero RN  07/29/19 1944

## 2019-07-29 NOTE — PHYSICAL THERAPY NOTE
Physical Therapy Evaluation     Patient's Name: Maryann Soto    Admitting Diagnosis  Head injury [S09 90XA]    Problem List  Patient Active Problem List   Diagnosis    Ambulatory dysfunction    Dementia    Essential hypertension    Other hyperlipidemia    Overactive bladder    Tibial plateau fracture, left       Past Medical History  Past Medical History:   Diagnosis Date    Dementia     Hyperlipidemia     Hypertension        Past Surgical History  Past Surgical History:   Procedure Laterality Date    HYSTERECTOMY            07/29/19 1009   Note Type   Note type Eval only   Pain Assessment   Pain Assessment No/denies pain  ("I'm fine"/ no pain reported pre and post evaluation)   Pain Score No Pain   Home Living   Type of [facility] Two level  (pt unsure if she lives upstairs/downstairs)   Bathroom Shower/Tub   (pt unable to provide information)   Home Equipment   (pt unable to recall if she uses any AD at baseline)   Additional Comments pt questionable historian given cognitive impairments, CM to follow up   Prior Function   Level of [place] Independent with ADLs and functional mobility  (per pt)   Lives With Daughter  (per pt)   Receives Help From Family   ADL Assistance Independent  (per pt)   IADLs Needs assistance   Falls in the last 6 months 1 to 4  (pt denying any injuries from falls)   Comments pt reports "right now I'm living with my daughter"  (-) driving, pt reports dtr does cooking   Restrictions/Precautions   Weight Bearing Precautions Per Order Yes  (will maintain NWB until cleared by MD given XR findings)   LLE Weight Bearing Per Order NWB  (until cleared by MD)   Other Precautions Cognitive; Bed Alarm;O2;Fall Risk   General   Family/Caregiver Present No   Cognition   Overall Cognitive Status Impaired   Arousal/Participation Alert   Orientation Level Oriented to person;Disoriented to place; Disoriented to situation;Disoriented to time   Memory Decreased recall of biographical information;Decreased short term memory;Decreased recall of recent events;Decreased recall of precautions   Following Commands Follows one step commands with increased time or repetition   RUE Assessment   RUE Assessment   (defer to OT eval for comments)   LUE Assessment   LUE Assessment   (defer to OT eval for comments)   RLE Assessment   RLE Assessment X   LLE Assessment   LLE Assessment X  (did not assess 2* XR findings of L knee)   Coordination   Sensation X  ("tingling and cramping to R calf" - ALLEGRA Diaz notified)   Light Touch   RLE Light Touch Impaired   LLE Light Touch Grossly intact  (per pt- no numbness or tingling reported)   Bed Mobility   Rolling R 4  Minimal assistance   Additional items Assist x 1;HOB elevated; Increased time required;Verbal cues; Bedrails  (to position posey alarm pad under pt)   Rolling L 4  Minimal assistance   Additional items Assist x 1;HOB elevated; Increased time required;Verbal cues; Bedrails   Supine to Sit Unable to assess  (not appropriate at this time)   Additional Comments PT observed pt spontaneously moving B LEs in bed upon arrival, pt denying any pain to L knee  Reports "cramping" pain to R calf  PT instructed pt to limit movement to L knee, verbal cues required to maintain  Mobility deferred at this time 2* XR findings of L knee "Linear radiodensity within the central and lateral proximal tibia raising suspicion for possible impaction fracture within the plateau  No evidence for hemarthrosis demonstrated  Soft tissue swelling noted medially "   Transfers   Sit to Stand Unable to assess   Stand to Sit Unable to assess   Additional Comments Mobility assessment deferred at this time 2* L knee XR findings suspicious for fracture  PT/OT discussed with ALLEGRA Diaz, pt to remain bedrest, NWB, recommend ortho consult     Ambulation/Elevation   Gait pattern Not tested   Balance   Static Sitting   (deferred at this time)   Static Standing   (not appropriate at this time)   Endurance Deficit   Endurance Deficit Yes   Activity Tolerance   Activity Tolerance Patient limited by fatigue; Other (Comment)  (cognition)   Medical Staff Made Aware TigerText from Fatimah Brasher CM asking PT/OT assess pt- priority for d/c  OT Evy performed co eval  PT discussed case with Dr Arabella Cherry via [address] telephone call  PT updated CM Student regarding session limitiations and recommendations  Nurse Made Aware ALLEGRA De Paz verbalized pt appropriate to see, made aware of session outcome/recs   Assessment   Prognosis Fair   Problem List Decreased strength;Decreased endurance; Impaired balance;Decreased mobility; Decreased cognition; Impaired judgement;Decreased safety awareness;Orthopedic restrictions; Impaired sensation   Assessment Pt is 80 y o  female seen for PT evaluation on 7/29/2019 s/p admit to [facility] on 7/28/2019 w/ Ambulatory dysfunction  PT consulted to assess pt's functional mobility and d/c needs  Order placed for PT eval and tx, w/ up w/ A order  Performed at least 2 patient identifiers during session: Name and wristband  Comorbidities affecting pt's physical performance at time of assessment include: dementia, HLD, HTN  Pt poor historian given cognitive deficits, CM to follow up  Pt reporting she lives with her daughter currently in a 2 story house, pt unsure if she required AD at baseline to mobilize  Personal factors affecting pt at time of IE include: decreased cognition, limited home support, positive fall history, decreased initiation and engagement and limited insight into impairments  Please find objective findings from PT assessment regarding body systems outlined above with impairments and limitations including weakness, decreased activity tolerance, decreased safety awareness, fall risk and decreased cognition  Mobility assessment deferred at this time 2* L knee XR findings suspicious for fracture  PT/OT discussed with ALLEGRA De Paz, pt to remain bedrest, NWB, recommend ortho consult   PT will await further mobility until pt cleared by orthopedics  The following objective measures performed on IE also reveal limitations: Barthel Index: 25/100 and Modified Moffat: 5 (severe disability)  Pt's clinical presentation is currently unstable/unpredictable seen in pt's presentation of abnormal lab value(s), need for input for task focus and mobility technique and ongoing medical assessment  Pt to benefit from continued PT tx to address deficits as defined above and maximize level of functional independent mobility and consistency  From PT/mobility standpoint, recommendation at time of d/c would be STR pending progress in order to facilitate return to PLOF  Barriers to Discharge Inaccessible home environment;Decreased caregiver support   Goals   Patient Goals none expressed   STG Expiration Date 08/08/19   Short Term Goal #1 In 7-10 days: Perform all bed mobility tasks with SBA to decrease caregiver burden, Improve Barthel Index score to 40 or greater to facilitate independence and PT to see and establish goals for LE strength when appropriate to assess/cleared by MD, transfers, ambulation when appropriate   Treatment Day 0   Plan   Treatment/Interventions Spoke to MD;Continued evaluation;Spoke to nursing;Bed mobility; Equipment eval/education;Patient/family training;Cognitive reorientation;Spoke to case management;OT   PT Frequency   (3-5x/wk)   Recommendation   Recommendation Short-term skilled PT; Other (Comment)  (ortho consult)   Equipment Recommended   (TBD)   PT - OK to Discharge No   Modified Moffat Scale   Modified Moffat Scale 5   Barthel Index   Feeding 5   Bathing 0   Grooming Score 0   Dressing Score 5   Bladder Score 5   Bowels Score 5   Toilet Use Score 5   Transfers (Bed/Chair) Score 0   Mobility (Level Surface) Score 0   Stairs Score 0   Barthel Index Score 25         Korina Bradley, PT, DPT

## 2019-07-29 NOTE — ASSESSMENT & PLAN NOTE
· Worsening dementia w/ increasing hallucinations  · Continue home dose Aricept, Cymbalta, Namenda and Risperdal  · Gerontology consult

## 2019-07-29 NOTE — ASSESSMENT & PLAN NOTE
· Multiple falls over past couple month  Most recent on the evening of admission     · Daughter feels that her mother is no longer safe at home and would like to look into placement options  · PT/OT eval  · CM consult for discharge planning  · Up with assistance only

## 2019-07-29 NOTE — SOCIAL WORK
Cm informed patient needed to be admitted to a skilled nursing facility from the ED  Patient has dementia, poor historian  Cm contacted patient daughter Dino Steward regarding care coordination  Friant Lung stated patient resides in her home and was using a cane to ambulate  Cm informed patient did not have vna at home for assistance, patient daughter and  were taking care of patient  Cm informed patient fills her prescriptions at [facility] with no co-payment barriers  Cm informed patient has depression however, no inpatient phychiatric placements  Patient is transported to MD appointments by her family  Cm informed family would like patient to discharge to a skilled nursing facility in [facility] [place]  Daughter provided consent to send referrals to local skilled nursing facilities, excluding [facility]  Cm informed patient does not have a POA at this time , she can make medical decisions on patient behalf  Cm reviewed obs notice, daughter reports understanding  obs notice mailed to patient home  Daughter also aware if patient needed to be admitted from ED she will go MA pending, daughter stated patient did not have any hospitalizations within 30 days  CM reviewed discharge planning process including the following: identifying help at home, patient preference for discharge planning needs, pharmacy preference, and availability of treatment team to discuss questions or concerns patient and/or family may have regarding understanding medications and recognizing signs and symptoms once discharged  CM also encouraged patient to follow up with all recommended appointments after discharge  Patient advised of importance for patient and family to participate in managing patients medical well being

## 2019-07-29 NOTE — UTILIZATION REVIEW
Initial Clinical Review    Admission: Date/Time/Statement:        7/28/19 @ [address]       7/29/19 @ 0138 Observation Written     Orders Placed This Encounter   Procedures    Place in Observation     Standing Status:   Standing     Number of Occurrences:   1     Order Specific Question:   Admitting Physician     Answer:   Glendy Gallegos     Order Specific Question:   Level of Care     Answer:   Med Surg [16]     ED Arrival Information     Expected Arrival Acuity Means of Arrival Escorted By Service Admission Type    - 7/28/2019 22:42 [facility]) Hospitalist Urgent    Arrival Complaint    -        Chief Complaint   Patient presents with   Berenice Rideau Fall     PT presents to ED via EMS with report of a fall this evening  Pt reports she did hit her head, No thinners and denies LOC  Daughter further reports pt has been unsteady on her feet lately and began hallucinating last night stating she is seeing people and hearing them talking  Assessment/Plan: 80 y o  female with PMH of HTN, HLD, dementia and overactive bladder who presents with worsening dementia and hallucinations  Per daughter had multiple falls a couple months ago and again on day of admissions  Daughter is afraid that it is no longer safe for her to be at home  Does report head injury with most recent fall  CT head completed in ED (-) for acute intracranial abnormality  A/O x 1 at baseline  Ambulatory dysfunction  Assessment & Plan  · Multiple falls over past couple month  Most recent on the evening of admission     · Daughter feels that her mother is no longer safe at home and would like to look into placement options  · PT/OT eval  · CM consult for discharge planning  · Up with assistance only  Dementia  Assessment & Plan  · Worsening dementia w/ increasing hallucinations  · Continue home dose Aricept, Cymbalta, Namenda and Risperdal  · Gerontology consult  Essential hypertension  Assessment & Plan  · BP adequately controlled on current regimen  · Continue home dose Lotrel  · Monitor BP per unit protocol  Other hyperlipidemia  Assessment & Plan  · No FLP on file  Will check in am  · Continue home dose Lipitor  Overactive bladder  Assessment & Plan  · Substitute home dose Mirabegron with Ditropan XL per formulary while inpt  VTE Prophylaxis: Enoxaparin (Lovenox)  / sequential compression device    Anticipated Length of Stay:  Patient will be admitted on an Observation basis with an anticipated length of stay of  less than 2 midnights      ED Triage Vitals   Temperature Pulse Respirations Blood Pressure SpO2   07/28/19 2246 07/28/19 2246 07/28/19 2246 07/28/19 2246 07/28/19 2246   98 7 °F (37 1 °C) 83 18 119/68 95 %      Temp Source Heart Rate Source Patient Position - Orthostatic VS BP Location FiO2 (%)   07/28/19 2246 07/28/19 2246 07/29/19 0247 07/28/19 2246 --   Oral Monitor Lying - Orthostatic VS Right arm       Pain Score       07/28/19 2246       No Pain        Wt Readings from Last 1 Encounters:   07/28/19 64 4 kg (141 lb 15 6 oz)     Additional Vital Signs:   Date/Time  Temp  Pulse  Resp  BP  SpO2  O2 Device  Patient Position - Orthostatic VS   07/29/19 0712  98 9 °F (37 2 °C)  75  18  155/94  94 %  Nasal cannula  --   07/29/19 0330  --  70  18  168/81  94 %  None (Room air)  --   07/29/19 0252  --  84  18  141/74  --  --  Standing - Orthostatic VS   07/29/19 0250  --  80  18  136/77  --  --  Sitting - Orthostatic VS   07/29/19 0247  --  75  18  130/60  --  --  Lying - Orthostatic VS   07/29/19 0230  --  81  18  154/84  94 %  None (Room air)  --   07/29/19 0130  --  75  18  121/71  95 %  None (Room air)  --   07/29/19 0030  --  76  18  133/79  95 %  None (Room air)  --   07/28/19 2330  --  78  18  126/75  95 %  None (Room air)  --   07/28/19 2246  98 7 °F (37 1 °C)  83  18  119/68  95 %  None (Room air)       Pertinent Labs/Diagnostic Test Results:   Results from last 7 days   Lab Units 07/29/19  0026   WBC Thousand/uL 9  79   HEMOGLOBIN g/dL 13 3   HEMATOCRIT % 41 2   PLATELETS Thousands/uL 252   NEUTROS ABS Thousands/µL 6 58     Results from last 7 days   Lab Units 07/29/19  0026   SODIUM mmol/L 141   POTASSIUM mmol/L 4 4   CHLORIDE mmol/L 105   CO2 mmol/L 29   ANION GAP mmol/L 7   BUN mg/dL 21   CREATININE mg/dL 1 11   EGFR ml/min/1 73sq m 44   CALCIUM mg/dL 11 2*     Results from last 7 days   Lab Units 07/29/19  0026   AST U/L 14   ALT U/L 8*   ALK PHOS U/L 63   TOTAL PROTEIN g/dL 7 2   ALBUMIN g/dL 3 6   TOTAL BILIRUBIN mg/dL 0 40     Results from last 7 days   Lab Units 07/29/19  0026   GLUCOSE RANDOM mg/dL 120     Results from last 7 days   Lab Units 07/29/19  0044   CLARITY UA  Clear   COLOR UA  Yellow   SPEC GRAV UA  >=1 030   PH UA  5 5   GLUCOSE UA mg/dl Negative   KETONES UA mg/dl Negative   BLOOD UA  Negative   PROTEIN UA mg/dl Trace*   NITRITE UA  Negative   BILIRUBIN UA  Negative   UROBILINOGEN UA E U /dl 1 0   LEUKOCYTES UA  Trace*   WBC UA /hpf 2-4*   RBC UA /hpf None Seen   BACTERIA UA /hpf Occasional   EPITHELIAL CELLS WET PREP /hpf Occasional     7/28 CT HEAD:  No acute intracranial abnormality  Microangiopathic changes  7/28 CT CSPINE:  No cervical spine fracture or traumatic malalignment  Large retrodens pannus as well as some erosive change at the dens, which may be seen with inflammatory arthropathy such as rheumatoid arthritis  7/29 XR LEFT KNEE:  PENDING  7/29 CXR:  PENDING  ED Treatment:   Medication Administration from 07/28/2019 2242 to 07/29/2019 1415       Date/Time Order Dose Route Action     07/29/2019 0144 ceftriaxone (ROCEPHIN) 1 g/50 mL in dextrose IVPB 1,000 mg Intravenous New Bag        Past Medical History:   Diagnosis Date    Dementia     Hyperlipidemia     Hypertension      Present on Admission:   Ambulatory dysfunction   Dementia   Essential hypertension   Other hyperlipidemia   Overactive bladder      Admitting Diagnosis: Head injury [S09 90XA]  Age/Sex: 80 y o  female  Admission Orders:  oob with assist  Orthostatic BP  scd  Current Facility-Administered Medications:  acetaminophen 650 mg Oral Q6H PRN   aluminum-magnesium hydroxide-simethicone 30 mL Oral Q6H PRN   amLODIPine 5 mg Oral Daily   And      lisinopril 20 mg Oral Daily   aspirin 81 mg Oral Daily   atorvastatin 20 mg Oral Daily   cholecalciferol 2,000 Units Oral Daily   donepezil 10 mg Oral HS   DULoxetine 60 mg Oral Daily   enoxaparin 30 mg Subcutaneous Daily   magnesium hydroxide 30 mL Oral Daily PRN   memantine 10 mg Oral BID   ondansetron 4 mg Intravenous Q6H PRN   oxybutynin 10 mg Oral Daily   polyethylene glycol 17 g Oral Daily   risperiDONE 0 5 mg Oral BID       IP CONSULT TO GERONTOLOGY  IP CONSULT TO CASE MANAGEMENT    Network Utilization Review Department  Phone: [phone]; Fax [phone]  [email or web address]  ATTENTION: Please call with any questions or concerns to [phone]  and carefully listen to the prompts so that you are directed to the right person  Send all requests for admission clinical reviews, approved or denied determinations and any other requests to fax [phone]   All voicemails are confidential

## 2019-07-29 NOTE — PLAN OF CARE
Problem: PHYSICAL THERAPY ADULT  Goal: Performs mobility at highest level of function for planned discharge setting  See evaluation for individualized goals  Description  Treatment/Interventions: Spoke to MD, Continued evaluation, Spoke to nursing, Bed mobility, Equipment eval/education, Patient/family training, Cognitive reorientation, Spoke to case management, OT  Equipment Recommended: (TBD)       See flowsheet documentation for full assessment, interventions and recommendations  Note:   Prognosis: Fair  Problem List: Decreased strength, Decreased endurance, Impaired balance, Decreased mobility, Decreased cognition, Impaired judgement, Decreased safety awareness, Orthopedic restrictions, Impaired sensation  Assessment: Pt is 80 y o  female seen for PT evaluation on 7/29/2019 s/p admit to [facility] on 7/28/2019 w/ Ambulatory dysfunction  PT consulted to assess pt's functional mobility and d/c needs  Order placed for PT eval and tx, w/ up w/ A order  Performed at least 2 patient identifiers during session: Name and wristband  Comorbidities affecting pt's physical performance at time of assessment include: dementia, HLD, HTN  Pt poor historian given cognitive deficits, CM to follow up  Pt reporting she lives with her daughter currently in a 2 story house, pt unsure if she required AD at baseline to mobilize  Personal factors affecting pt at time of IE include: decreased cognition, limited home support, positive fall history, decreased initiation and engagement and limited insight into impairments  Please find objective findings from PT assessment regarding body systems outlined above with impairments and limitations including weakness, decreased activity tolerance, decreased safety awareness, fall risk and decreased cognition  Mobility assessment deferred at this time 2* L knee XR findings suspicious for fracture  PT/OT discussed with ALLEGRA Chou, pt to remain bedrest, NWB, recommend ortho consult   PT will await further mobility until pt cleared by orthopedics  The following objective measures performed on IE also reveal limitations: Barthel Index: 25/100 and Modified Hollie: 5 (severe disability)  Pt's clinical presentation is currently unstable/unpredictable seen in pt's presentation of abnormal lab value(s), need for input for task focus and mobility technique and ongoing medical assessment  Pt to benefit from continued PT tx to address deficits as defined above and maximize level of functional independent mobility and consistency  From PT/mobility standpoint, recommendation at time of d/c would be STR pending progress in order to facilitate return to PLOF  Barriers to Discharge: Inaccessible home environment, Decreased caregiver support     Recommendation: Short-term skilled PT, Other (Comment)(ortho consult)     PT - OK to Discharge: No    See flowsheet documentation for full assessment

## 2019-07-29 NOTE — ED NOTES
Daughter at bedside      Veronika Weeks, [facility]  07/29/19 6796

## 2019-07-29 NOTE — ED NOTES
Case Management Consult requested via [facility] at this time per unit protocol        Annabelle, [address] [facility]  07/29/19 1923

## 2019-07-29 NOTE — H&P
H&P- Vincent Orellana 4/20/1928, 80 y o  female MRN: [id number]    Unit/Bed#: ED 06 Encounter: [id number]    Primary Care Provider: Devika Escalante   Date and time admitted to hospital: 7/28/2019 10:42 PM        * Ambulatory dysfunction  Assessment & Plan  · Multiple falls over past couple month  Most recent on the evening of admission  · Daughter feels that her mother is no longer safe at home and would like to look into placement options  · PT/OT eval  · CM consult for discharge planning  · Up with assistance only    Dementia  Assessment & Plan  · Worsening dementia w/ increasing hallucinations  · Continue home dose Aricept, Cymbalta, Namenda and Risperdal  · Gerontology consult      Essential hypertension  Assessment & Plan  · BP adequately controlled on current regimen  · Continue home dose Lotrel  · Monitor BP per unit protocol    Other hyperlipidemia  Assessment & Plan  · No FLP on file  Will check in am  · Continue home dose Lipitor    Overactive bladder  Assessment & Plan  · Substitute home dose Mirabegron with Ditropan XL per formulary while inpt      VTE Prophylaxis: Enoxaparin (Lovenox)  / sequential compression device   Code Status: Level 1 Full Code  POLST: There is no POLST form on file for this patient (pre-hospital)  Discussion with family: Daughter at bedside    Anticipated Length of Stay:  Patient will be admitted on an Observation basis with an anticipated length of stay of  less than 2 midnights  Justification for Hospital Stay: See AP above    Total Time for Visit, including Counseling / Coordination of Care: 30 minutes  Greater than 50% of this total time spent on direct patient counseling and coordination of care  Chief Complaint:   Per daughter has worsening dementia w/ hallucinations and multiple falls over the past few months       History of Present Illness:    Vincent Orellana is a 80 y o  female with PMH of HTN, HLD, dementia and overactive bladder who presents with worsening dementia and hallucinations  Per daughter had multiple falls a couple months ago and again on day of admissions  Daughter is afraid that it is no longer safe for her to be at home  Does report head injury with most recent fall  CT head completed in ED (-) for acute intracranial abnormality  A/O x 1 at baseline  Denies recent fever/chills  Denies CHP/palpitations  Denies urinary symptoms  Denies recent URI symptoms  Review of Systems:    Review of Systems   Constitutional: Negative for activity change, appetite change, fatigue and fever  HENT: Negative for ear pain, sinus pressure and sore throat  Eyes: Negative for pain and visual disturbance  Respiratory: Negative for cough, shortness of breath and wheezing  Cardiovascular: Negative for chest pain, palpitations and leg swelling  Gastrointestinal: Positive for constipation  Negative for abdominal pain, diarrhea, nausea and vomiting  Genitourinary: Negative for difficulty urinating, dysuria and urgency  Musculoskeletal: Negative for neck pain and neck stiffness  Neurological: Positive for weakness  Negative for dizziness, syncope and headaches  Past Medical and Surgical History:     Past Medical History:   Diagnosis Date    Dementia     Hyperlipidemia     Hypertension        Past Surgical History:   Procedure Laterality Date    HYSTERECTOMY         Meds/Allergies:    Prior to Admission medications    Medication Sig Start Date End Date Taking?  Authorizing Provider   amLODIPine-benazepril (LOTREL 5-20) 5-20 MG per capsule Take 1 capsule by mouth daily   Yes Historical Provider, MD   aspirin 81 mg chewable tablet Chew 81 mg daily   Yes Historical Provider, MD   atorvastatin (LIPITOR) 20 mg tablet Take 20 mg by mouth daily   Yes Historical Provider, MD   cholecalciferol (VITAMIN D3) 1,000 units tablet Take 2,000 Units by mouth daily   Yes Historical Provider, MD   donepezil (ARICEPT) 10 mg tablet Take 10 mg by mouth daily at bedtime   Yes Historical Provider, MD   DULoxetine (CYMBALTA) 60 mg delayed release capsule Take 60 mg by mouth daily   Yes Historical Provider, MD   memantine (NAMENDA) 10 mg tablet Take 10 mg by mouth 2 (two) times a day   Yes Historical Provider, MD   Mirabegron ER 50 MG TB24 Take 50 mg by mouth daily   Yes Historical Provider, MD   risperiDONE (RisperDAL) 0 5 mg tablet Take 0 5 mg by mouth 2 (two) times a day   Yes Historical Provider, MD     I have reviewed home medications with patient family member  Allergies: No Known Allergies    Social History:     Marital Status:    Patient Pre-hospital Living Situation: Lives w/ daughter  Patient Pre-hospital Level of Mobility: Ambulatory  Uses both cane and walker  Patient Pre-hospital Diet Restrictions: None  Substance Use History:   Social History     Substance and Sexual Activity   Alcohol Use Never    Frequency: Never     Social History     Tobacco Use   Smoking Status Never Smoker   Smokeless Tobacco Never Used     Social History     Substance and Sexual Activity   Drug Use Never       Family History:    Family History   Family history unknown: Yes       Physical Exam:     Vitals:   Blood Pressure: 168/81 (07/29/19 0330)  Pulse: 70 (07/29/19 0330)  Temperature: 98 7 °F (37 1 °C) (07/28/19 2246)  Temp Source: Oral (07/28/19 2246)  Respirations: 18 (07/29/19 0330)  Height: 5' 4" (162 6 cm) (07/28/19 2246)  Weight - Scale: 64 4 kg (141 lb 15 6 oz) (07/28/19 2246)  SpO2: 94 % (07/29/19 0330)    Physical Exam   Constitutional: She appears well-developed and well-nourished  No distress  HENT:   Head: Normocephalic and atraumatic  Eyes: Pupils are equal, round, and reactive to light  Conjunctivae are normal    Neck: Normal range of motion  Neck supple  Cardiovascular: Normal rate, regular rhythm and intact distal pulses  Exam reveals no gallop and no friction rub  Murmur heard  Pulmonary/Chest: Effort normal and breath sounds normal  No respiratory distress   She has no wheezes  She has no rales  Abdominal: Soft  Bowel sounds are normal  There is no tenderness  There is no rebound and no guarding  Musculoskeletal: Normal range of motion  She exhibits no edema or tenderness  Neurological: She is alert  A/O x 1   Skin: Skin is warm and dry  Additional Data:     Lab Results: I have personally reviewed pertinent reports  Results from last 7 days   Lab Units 07/29/19  0026   WBC Thousand/uL 9 79   HEMOGLOBIN g/dL 13 3   HEMATOCRIT % 41 2   PLATELETS Thousands/uL 252   NEUTROS PCT % 67   LYMPHS PCT % 21   MONOS PCT % 9   EOS PCT % 2     Results from last 7 days   Lab Units 07/29/19  0026   SODIUM mmol/L 141   POTASSIUM mmol/L 4 4   CHLORIDE mmol/L 105   CO2 mmol/L 29   BUN mg/dL 21   CREATININE mg/dL 1 11   ANION GAP mmol/L 7   CALCIUM mg/dL 11 2*   ALBUMIN g/dL 3 6   TOTAL BILIRUBIN mg/dL 0 40   ALK PHOS U/L 63   ALT U/L 8*   AST U/L 14   GLUCOSE RANDOM mg/dL 120                       Imaging: I have personally reviewed pertinent reports  XR knee 4+ views left injury   ED Interpretation by Gabby Guidry MD (07/29 0126)   No acute fracture or dislocation      XR chest 2 views   ED Interpretation by Gabby Guidry MD (07/29 0126)   No acute disease      CT head without contrast   Final Result by Maciel Vega MD (07/29 0102)      No acute intracranial abnormality  Microangiopathic changes  Workstation performed: [id number]         CT spine cervical without contrast   Final Result by Maciel Vega MD (07/29 0107)      No cervical spine fracture or traumatic malalignment  Large retrodens pannus as well as some erosive change at the dens, which may be seen with inflammatory arthropathy such as rheumatoid arthritis                     Workstation performed: [id number]             EKG, Pathology, and Other Studies Reviewed on Admission:   · EKG: NA    Allscripts / Epic Records Reviewed: Yes     ** Please Note: This note has been constructed using a voice recognition system   **

## 2019-07-29 NOTE — CONSULTS
Orthopedics   [facility] 80 y o  female MRN: [id number]  Unit/Bed#: MO XRAY      Chief Complaint:   left knee swelling s/p fall    HPI:   80 y  o female status post fall complaining of left knee swelling but no pain  Patient is resting in the bed  No family present  She fell at home yesterday  She lives with her daughter but has been having increased hallucinations and ambulatory dysfunction  The nurse was present for the exam   The patient describes her knee pain as "pinching "   Denies any previous surgeries or injuries to the left knee  She is able to answer simple questions  Review Of Systems:   · Skin: Normal  · Neuro: See HPI  · Musculoskeletal: See HPI  · 14 point review of systems negative except as stated above     Past Medical History:   Past Medical History:   Diagnosis Date    Dementia     Hyperlipidemia     Hypertension        Past Surgical History:   Past Surgical History:   Procedure Laterality Date    HYSTERECTOMY         Family History:  Family history reviewed and non-contributory  Family History   Family history unknown: Yes       Social History:  Social History     Socioeconomic History    Marital status:       Spouse name: None    Number of children: None    Years of education: None    Highest education level: None   Occupational History    None   Social Needs    Financial resource strain: None    Food insecurity:     Worry: None     Inability: None    Transportation needs:     Medical: None     Non-medical: None   Tobacco Use    Smoking status: Never Smoker    Smokeless tobacco: Never Used   Substance and Sexual Activity    Alcohol use: Never     Frequency: Never    Drug use: Never    Sexual activity: None   Lifestyle    Physical activity:     Days per week: None     Minutes per session: None    Stress: None   Relationships    Social connections:     Talks on phone: None     Gets together: None     Attends [employer] service: None     Active member of club or organization: None     Attends meetings of clubs or organizations: None     Relationship status: None    Intimate partner violence:     Fear of current or ex partner: None     Emotionally abused: None     Physically abused: None     Forced sexual activity: None   Other Topics Concern    None   Social History Narrative    None       Allergies:   No Known Allergies        Labs:  0   Lab Value Date/Time    HCT 41 2 07/29/2019 0026    HCT 41 9 06/14/2019 1019    HGB 13 3 07/29/2019 0026    HGB 13 5 06/14/2019 1019    WBC 9 79 07/29/2019 0026    WBC 10 06 06/14/2019 1019       Meds:    Current Facility-Administered Medications:     acetaminophen (TYLENOL) tablet 650 mg, 650 mg, Oral, Q6H PRN, Reyna Friend PA-C    aluminum-magnesium hydroxide-simethicone (MYLANTA) 200-200-20 mg/5 mL oral suspension 30 mL, 30 mL, Oral, Q6H PRN, Reyna Friend PA-C    amLODIPine (NORVASC) tablet 5 mg, 5 mg, Oral, Daily, 5 mg at 07/29/19 0929 **AND** lisinopril (ZESTRIL) tablet 20 mg, 20 mg, Oral, Daily, Reyna Friend PA-C, 20 mg at 07/29/19 3165    aspirin chewable tablet 81 mg, 81 mg, Oral, Daily, Reyna Friend PA-C, 81 mg at 07/29/19 0929    atorvastatin (LIPITOR) tablet 20 mg, 20 mg, Oral, Daily, Reyna Friend PA-C, 20 mg at 07/29/19 7494    cholecalciferol (VITAMIN D3) tablet 2,000 Units, 2,000 Units, Oral, Daily, Jolayne Homans, PA-C, 2,000 Units at 07/29/19 3987    donepezil (ARICEPT) tablet 10 mg, 10 mg, Oral, HS, Reyna Friend PA-C    DULoxetine (CYMBALTA) delayed release capsule 60 mg, 60 mg, Oral, Daily, uRfus Friend PA-C, 60 mg at 07/29/19 0929    enoxaparin (LOVENOX) subcutaneous injection 30 mg, 30 mg, Subcutaneous, Daily, Reyna Friend PA-C, 30 mg at 07/29/19 9480    magnesium hydroxide (MILK OF MAGNESIA) 400 mg/5 mL oral suspension 30 mL, 30 mL, Oral, Daily PRN, Jolayne Homans, PA-C    memantine [facility]) tablet 10 mg, 10 mg, Oral, BID, Jolayne Homans, PA-C, 10 mg at 07/29/19 2179   ondansetron (ZOFRAN) injection 4 mg, 4 mg, Intravenous, Q6H PRN, Rick Nelson PA-C    oxybutynin (DITROPAN-XL) 24 hr tablet 10 mg, 10 mg, Oral, Daily, Reyna Friend PA-C, 10 mg at 07/29/19 0037    polyethylene glycol (MIRALAX) packet 17 g, 17 g, Oral, Daily, Reyna Friend PA-C    risperiDONE (RisperDAL) tablet 0 5 mg, 0 5 mg, Oral, BID, Reyna Friend PA-C, 0 5 mg at 07/29/19 1014    Current Outpatient Medications:     amLODIPine-benazepril (LOTREL 5-20) 5-20 MG per capsule, Take 1 capsule by mouth daily, Disp: , Rfl:     aspirin 81 mg chewable tablet, Chew 81 mg daily, Disp: , Rfl:     atorvastatin (LIPITOR) 20 mg tablet, Take 20 mg by mouth daily, Disp: , Rfl:     cholecalciferol (VITAMIN D3) 1,000 units tablet, Take 2,000 Units by mouth daily, Disp: , Rfl:     donepezil (ARICEPT) 10 mg tablet, Take 10 mg by mouth daily at bedtime, Disp: , Rfl:     DULoxetine (CYMBALTA) 60 mg delayed release capsule, Take 60 mg by mouth daily, Disp: , Rfl:     memantine (NAMENDA) 10 mg tablet, Take 10 mg by mouth 2 (two) times a day, Disp: , Rfl:     Mirabegron ER 50 MG TB24, Take 50 mg by mouth daily, Disp: , Rfl:     risperiDONE (RisperDAL) 0 5 mg tablet, Take 0 5 mg by mouth 2 (two) times a day, Disp: , Rfl:     Blood Culture:   No results found for: BLOODCX    Wound Culture:   No results found for: WOUNDCULT    Ins and Outs:  No intake/output data recorded  Physical Exam:   /76   Pulse 71   Temp 98 9 °F (37 2 °C)   Resp 16   Ht 5' 4" (1 626 m)   Wt 64 4 kg (141 lb 15 6 oz)   SpO2 95%   BMI 24 37 kg/m²   Gen: Alert and pleasantly confused  HEENT: EOMI, eyes clear, moist mucus membranes, hearing intact  Respiratory: Bilateral chest rise  No audible wheezing found  Cardiovascular: Regular Rate and Rhythm  Abdomen: soft nontender/nondistended  Musculoskeletal: left lower extremity  · Skin intact    Mild chronic appearing swelling about the knee  · Non-tender to palpation over entire knee and proximal tibia  · Active knee ROM 0-90 without pain  · No pain to varus/valgus stress   · Sensation intact dp/sp/tib/nikki/saph  · Positive ankle dorsi/plantar flexion, EHL/FHL  · Palpable PT and DP pulses    Radiology:   I personally reviewed the films  X-rays left knee shows no acute fracture or dislocation  Positive degenerative changes about the knee with joint space narrowing  Radiologist read the xrays as a linear radiodensity that may represent a fracture, but the patient had good motion without pain and no ecchymosis  Nontender on palpation       _*_*_*_*_*_*_*_*_*_*_*_*_*_*_*_*_*_*_*_*_*_*_*_*_*_*_*_*_*_*_*_*_*_*_*_*_*_*_*_*_*    Assessment:  80 y  o female status post fall with left chronic knee swelling    Plan:   · WBAT BLE with walker for fall prevention  PT/OT ok to work with the patient  · May need SNF/Rehab/long term placement as she has a history of frequent falls        · Dispo: Ortho signing off    Ashley Jimenez PA-C

## 2019-07-29 NOTE — PLAN OF CARE
Problem: OCCUPATIONAL THERAPY ADULT  Goal: Performs self-care activities at highest level of function for planned discharge setting  See evaluation for individualized goals  Description  Treatment Interventions: ADL retraining, Functional transfer training, UE strengthening/ROM, Endurance training, Activityengagement, Energy conservation, Cardiac education, Continued evaluation, Compensatory technique education, Equipment evaluation/education, Patient/family training          See flowsheet documentation for full assessment, interventions and recommendations  Note:   Limitation: Decreased ADL status, Decreased self-care trans, Decreased high-level ADLs, Decreased UE strength, Decreased endurance, Mood limitation, Decreased Safe judgement during ADL, Decreased cognition  Prognosis: Fair  Assessment: Pt is a 80 y o  female seen for OT evaluation s/p admit to [facility] on 7/28/2019 w/ Ambulatory dysfunction  Comorbidities affecting pt's functional performance at time of assessment include:dementia, HTN, left tibial plateau fracture, and overactive bladder   Orders placed for OT evaluation and treatment and "up with A" order  Performed at least two patient identifiers during session including name and wristband  Personal factors affecting pt at time of IE include:limited home support, behavioral pattern, difficulty performing ADLS, difficulty performing IADLS , limited insight into deficits, decreased initiation and engagement , financial barriers, health management  and environment  Prior to admission, pt was a poor historian but likely required A with ADLs, A with IADLs, and (-) driving  Upon evaluation: Pt requires min A with UB ADLs, mod A with LB ADLs, and deferred xfers/ functional mobility   Limitations 2* the following deficits impacting occupational performance: weakness, decreased dynamic sit/ stand balance, decreased activity tolerance, decreased standing tolerance time for self care and functional mobility, impaired attention, impaired initiation, impaired memory, impaired sequencing, impaired problem solving, impulsivity, decreased safety awareness, orthopedic restrictions, impaired interpersonal skills, environmental deficits, decreased coping skills, decreased mobilty and requiring external assistance to complete transitional movements  Pt to benefit from continued skilled OT tx while in the hospital to address deficits as defined above and maximize level of functional independence w ADL's and functional mobility  Occupational Performance areas to address include: bathing/shower, toilet hygiene, dressing, medication management, socialization, health maintenance, functional mobility, community mobility, clothing management, household maintenance and job performance/volunteering  From OT standpoint, recommendation at time of d/c would be STR         OT Discharge Recommendation: Short Term Rehab  OT - OK to Discharge: No

## 2019-07-30 LAB
ANION GAP SERPL CALCULATED.3IONS-SCNC: 6 MMOL/L (ref 4–13)
BASOPHILS # BLD AUTO: 0.06 THOUSANDS/ΜL (ref 0–0.1)
BASOPHILS NFR BLD AUTO: 1 % (ref 0–1)
BUN SERPL-MCNC: 12 MG/DL (ref 5–25)
CALCIUM SERPL-MCNC: 10.5 MG/DL (ref 8.3–10.1)
CHLORIDE SERPL-SCNC: 106 MMOL/L (ref 100–108)
CO2 SERPL-SCNC: 30 MMOL/L (ref 21–32)
CREAT SERPL-MCNC: 0.85 MG/DL (ref 0.6–1.3)
EOSINOPHIL # BLD AUTO: 0.28 THOUSAND/ΜL (ref 0–0.61)
EOSINOPHIL NFR BLD AUTO: 3 % (ref 0–6)
ERYTHROCYTE [DISTWIDTH] IN BLOOD BY AUTOMATED COUNT: 14.3 % (ref 11.6–15.1)
GFR SERPL CREATININE-BSD FRML MDRD: 60 ML/MIN/1.73SQ M
GLUCOSE SERPL-MCNC: 88 MG/DL (ref 65–140)
HCT VFR BLD AUTO: 40.6 % (ref 34.8–46.1)
HGB BLD-MCNC: 13.1 G/DL (ref 11.5–15.4)
IMM GRANULOCYTES # BLD AUTO: 0.03 THOUSAND/UL (ref 0–0.2)
IMM GRANULOCYTES NFR BLD AUTO: 0 % (ref 0–2)
LYMPHOCYTES # BLD AUTO: 1.96 THOUSANDS/ΜL (ref 0.6–4.47)
LYMPHOCYTES NFR BLD AUTO: 23 % (ref 14–44)
MCH RBC QN AUTO: 31.5 PG (ref 26.8–34.3)
MCHC RBC AUTO-ENTMCNC: 32.3 G/DL (ref 31.4–37.4)
MCV RBC AUTO: 98 FL (ref 82–98)
MONOCYTES # BLD AUTO: 0.66 THOUSAND/ΜL (ref 0.17–1.22)
MONOCYTES NFR BLD AUTO: 8 % (ref 4–12)
NEUTROPHILS # BLD AUTO: 5.54 THOUSANDS/ΜL (ref 1.85–7.62)
NEUTS SEG NFR BLD AUTO: 65 % (ref 43–75)
NRBC BLD AUTO-RTO: 0 /100 WBCS
PLATELET # BLD AUTO: 229 THOUSANDS/UL (ref 149–390)
PMV BLD AUTO: 11 FL (ref 8.9–12.7)
POTASSIUM SERPL-SCNC: 3.8 MMOL/L (ref 3.5–5.3)
RBC # BLD AUTO: 4.16 MILLION/UL (ref 3.81–5.12)
SODIUM SERPL-SCNC: 142 MMOL/L (ref 136–145)
WBC # BLD AUTO: 8.53 THOUSAND/UL (ref 4.31–10.16)

## 2019-07-30 PROCEDURE — 85025 COMPLETE CBC W/AUTO DIFF WBC: CPT | Performed by: PHYSICIAN ASSISTANT

## 2019-07-30 PROCEDURE — 80048 BASIC METABOLIC PNL TOTAL CA: CPT | Performed by: PHYSICIAN ASSISTANT

## 2019-07-30 RX ADMIN — ENOXAPARIN SODIUM 30 MG: 30 INJECTION SUBCUTANEOUS at 09:44

## 2019-07-30 RX ADMIN — MEMANTINE 10 MG: 10 TABLET ORAL at 09:44

## 2019-07-30 RX ADMIN — MEMANTINE 10 MG: 10 TABLET ORAL at 18:01

## 2019-07-30 RX ADMIN — DONEPEZIL HYDROCHLORIDE 10 MG: 5 TABLET ORAL at 21:33

## 2019-07-30 RX ADMIN — LISINOPRIL 20 MG: 20 TABLET ORAL at 09:43

## 2019-07-30 RX ADMIN — OXYBUTYNIN CHLORIDE 10 MG: 5 TABLET, EXTENDED RELEASE ORAL at 09:44

## 2019-07-30 RX ADMIN — AMLODIPINE BESYLATE 5 MG: 5 TABLET ORAL at 09:44

## 2019-07-30 RX ADMIN — RISPERIDONE 0.5 MG: 1 TABLET ORAL at 09:44

## 2019-07-30 RX ADMIN — POLYETHYLENE GLYCOL 3350 17 G: 17 POWDER, FOR SOLUTION ORAL at 09:44

## 2019-07-30 RX ADMIN — RISPERIDONE 0.5 MG: 1 TABLET ORAL at 18:01

## 2019-07-30 RX ADMIN — DULOXETINE HYDROCHLORIDE 60 MG: 60 CAPSULE, DELAYED RELEASE ORAL at 09:44

## 2019-07-30 RX ADMIN — VITAMIN D, TAB 1000IU (100/BT) 2000 UNITS: 25 TAB at 09:43

## 2019-07-30 RX ADMIN — ASPIRIN 81 MG 81 MG: 81 TABLET ORAL at 09:44

## 2019-07-30 RX ADMIN — ATORVASTATIN CALCIUM 20 MG: 40 TABLET, FILM COATED ORAL at 09:44

## 2019-07-30 NOTE — SOCIAL WORK
JOHN and Dr [facility] Bra[facility]) spoke to daughter Kathy Servin regarding dcp  [facility] [place] with MA pending  Kathy Langfordhallietom will meet with [address] office tomorrow with financial documents  Pt will then be discharged [place] [facility] via [place]LS  IMM reviewed and signed by daughter  Copy to pt and copy to MR for scanning

## 2019-07-30 NOTE — UTILIZATION REVIEW
OBSERVATION 7/29/19 @0138 CONVERTED TO INPATIENT 07/29/19 @1108 DUE TO FREQUENT FALLS, DEMENTIA    07/29/19 1108  Inpatient Admission Once     Transfer Service: General Medicine    Expected Discharge Date: 07/31/19       Question Answer Comment   Admitting Physician MOLLY AYON    Level of Care Med Surg    Estimated length of stay More than 2 Midnights    Certification I certify that inpatient services are medically necessary for this patient for a duration of greater than two midnights  See H&P and MD Progress Notes for additional information about the patient's course of treatment          07/29/19 1108

## 2019-07-30 NOTE — NUTRITION
Recommend Regular diet with chopped meats  Per family pt has some difficulty with self feeding due to poor vision  Offer finger foods as able and assist patient with meals

## 2019-07-30 NOTE — SOCIAL WORK
Pt has been accepted at [facility] and [facility], but daughter Sherrill Camacho states that their PCP Dr Abel Cabrera did not speak highly of these facilities  She is requesting referrals to [facility]  This was done and CM will keep pt and daughter of acceptances

## 2019-07-31 VITALS
HEART RATE: 72 BPM | WEIGHT: 141 LBS | HEIGHT: 64 IN | SYSTOLIC BLOOD PRESSURE: 159 MMHG | RESPIRATION RATE: 18 BRPM | TEMPERATURE: 98.3 F | DIASTOLIC BLOOD PRESSURE: 68 MMHG | OXYGEN SATURATION: 90 % | BODY MASS INDEX: 24.07 KG/M2

## 2019-07-31 PROBLEM — S82.142A TIBIAL PLATEAU FRACTURE, LEFT: Status: RESOLVED | Noted: 2019-07-29 | Resolved: 2019-07-31

## 2019-07-31 PROCEDURE — 99239 HOSP IP/OBS DSCHRG MGMT >30: CPT | Performed by: INTERNAL MEDICINE

## 2019-07-31 PROCEDURE — 97116 GAIT TRAINING THERAPY: CPT

## 2019-07-31 PROCEDURE — 97110 THERAPEUTIC EXERCISES: CPT

## 2019-07-31 RX ADMIN — ATORVASTATIN CALCIUM 20 MG: 40 TABLET, FILM COATED ORAL at 08:11

## 2019-07-31 RX ADMIN — ASPIRIN 81 MG 81 MG: 81 TABLET ORAL at 08:12

## 2019-07-31 RX ADMIN — AMLODIPINE BESYLATE 5 MG: 5 TABLET ORAL at 08:12

## 2019-07-31 RX ADMIN — ENOXAPARIN SODIUM 30 MG: 30 INJECTION SUBCUTANEOUS at 08:11

## 2019-07-31 RX ADMIN — VITAMIN D, TAB 1000IU (100/BT) 2000 UNITS: 25 TAB at 08:12

## 2019-07-31 RX ADMIN — POLYETHYLENE GLYCOL 3350 17 G: 17 POWDER, FOR SOLUTION ORAL at 08:11

## 2019-07-31 RX ADMIN — MEMANTINE 10 MG: 10 TABLET ORAL at 18:56

## 2019-07-31 RX ADMIN — LISINOPRIL 20 MG: 20 TABLET ORAL at 08:11

## 2019-07-31 RX ADMIN — OXYBUTYNIN CHLORIDE 10 MG: 5 TABLET, EXTENDED RELEASE ORAL at 08:12

## 2019-07-31 RX ADMIN — MEMANTINE 10 MG: 10 TABLET ORAL at 08:12

## 2019-07-31 RX ADMIN — RISPERIDONE 0.5 MG: 1 TABLET ORAL at 08:12

## 2019-07-31 RX ADMIN — RISPERIDONE 0.5 MG: 1 TABLET ORAL at 18:56

## 2019-07-31 RX ADMIN — DULOXETINE HYDROCHLORIDE 60 MG: 60 CAPSULE, DELAYED RELEASE ORAL at 08:12

## 2019-07-31 NOTE — PROGRESS NOTES
Progress Note - Alex [facility] 4/20/1928, 80 y o  female MRN: [id number]    Unit/Bed#: -01 Encounter: [id number]    Primary Care Provider: Wilfred Long   Date and time admitted to hospital: 7/28/2019 10:42 PM        * Ambulatory dysfunction  Assessment & Plan  · Multiple falls over past couple month  Most recent on the evening of admission  · Daughter feels that her mother is no longer safe at home and would like to look into placement options  · PT/OT eval, patient cleared from orthopedic standpoint for concern for fracture  This appears to be arthritic changes  · CM consult for discharge planning  · Up with assistance only    Tibial plateau fracture, left  Assessment & Plan  Orthopedics consult will ruled out tibial plateau fracture  Patient with arthritis  Continue with physical therapy weight-bearing as tolerated    Dementia  Assessment & Plan  · Worsening dementia w/ increasing hallucinations per family  None noted by staff  Patient pleasantly confused  · Continue home dose Aricept, Cymbalta, Namenda and Risperidal      Essential hypertension  Assessment & Plan  · BP adequately controlled on current regimen  · Continue home dose Lotrel  · Monitor BP per unit protocol    Other hyperlipidemia  Assessment & Plan  · Fasted lipid panel well within normal limits  · Continue home dose Lipitor    Overactive bladder  Assessment & Plan  · Substitute home dose Mirabegron with Ditropan XL per formulary while inpt  No worsening cognitive status continue Ditropan for now        VTE Pharmacologic Prophylaxis:   Pharmacologic: Enoxaparin (Lovenox)  Mechanical: Mechanical VTE prophylaxis in place  Patient Centered Rounds: I have performed bedside rounds with nursing staff today  Discussions with Specialists or Other Care Team Provider:  Care Management  Education and Discussions with Family / Patient:  Daughter at bedside  Time Spent for Care: 30 minutes    If More than 50% of total time spent on counseling and coordination of care as described above indicate yes or no and described the counseling and coordination:  No    Current Length of Stay: 1 day(s)  Current Patient Status: Inpatient   Certification Statement: The patient will continue to require additional inpatient hospital stay due to Unsafe discharge plan of    Discharge Plan: To be determined  Code Status: Level 1 - Full Code    Subjective:   Patient pleasantly confused sitting in the chair no acute distress    Objective:   Vitals:   Temp (24hrs), Av 2 °F (36 8 °C), Min:97 8 °F (36 6 °C), Max:98 5 °F (36 9 °C)    Temp:  [97 8 °F (36 6 °C)-98 5 °F (36 9 °C)] 98 3 °F (36 8 °C)  HR:  [72-81] 72  Resp:  [18] 18  BP: (116-159)/(68-79) 159/68  SpO2:  [90 %-98 %] 90 %  Body mass index is 24 2 kg/m²  Input and Output Summary (last 24 hours):        Intake/Output Summary (Last 24 hours) at 2019 0847  Last data filed at 2019 0814  Gross per 24 hour   Intake 360 ml   Output 200 ml   Net 160 ml       Physical Exam:  Generally well-developed reasonably nourished female no acute distress normocephalic atraumatic pupils equal round and reactive to light extraocular muscles intact mucous membranes are moist neck is supple there is no JVD no lymph nodes no carotid bruits chest is decreased but clear to auscultation is no rhonchi rales or wheezes  Cardiovascular regular rate rhythm positive S1 and S2 no S3-S4 murmur or gallop  Abdomen is soft nontender nondistended with positive bowel sounds no hepatosplenomegaly no guarding or rebound  Neurologically patient is awake alert oriented cranial nerves 2-12 intact    Physical Exam    Additional Data:   Labs:  Results from last 7 days   Lab Units 19  0503   WBC Thousand/uL 8 53   HEMOGLOBIN g/dL 13 1   HEMATOCRIT % 40 6   PLATELETS Thousands/uL 229   NEUTROS PCT % 65   LYMPHS PCT % 23   MONOS PCT % 8   EOS PCT % 3     Results from last 7 days   Lab Units 19  0503 19  0026   POTASSIUM mmol/L 3 8 4 4   CHLORIDE mmol/L 106 105   CO2 mmol/L 30 29   BUN mg/dL 12 21   CREATININE mg/dL 0 85 1 11   CALCIUM mg/dL 10 5* 11 2*   ALK PHOS U/L  --  63   ALT U/L  --  8*   AST U/L  --  14           * I Have Reviewed All Lab Data Listed Above  * Additional Pertinent Lab Tests Reviewed: All Labs Within Last 24 Hours Reviewed    Imaging:    Imaging Reports Reviewed Today Include:  None    Cultures:   Blood Culture: No results found for: BLOODCX  Urine Culture: No results found for: URINECX  Sputum Culture: No components found for: SPUTUMCX  Wound Culture: No results found for: WOUNDCULT    Last 24 Hours Medication List:     Current Facility-Administered Medications:  acetaminophen 650 mg Oral Q6H PRN Christina Kathleen, PA-C   aluminum-magnesium hydroxide-simethicone 30 mL Oral Q6H PRN Christina Kathleen, PA-C   amLODIPine 5 mg Oral Daily Christina Kathleen, PA-C   And       lisinopril 20 mg Oral Daily [facility], PA-C   aspirin 81 mg Oral Daily [facility], PA-C   atorvastatin 20 mg Oral Daily Christina Kathleen, PA-C   cholecalciferol 2,000 Units Oral Daily Christina Kathleen, PA-C   donepezil 10 mg Oral HS Christina Kathleen, PA-C   DULoxetine 60 mg Oral Daily Christina Kathleen, PA-C   enoxaparin 30 mg Subcutaneous Daily [facility] Friend, PA-C   magnesium hydroxide 30 mL Oral Daily PRN Christina Kathleen, PA-C   memantine 10 mg Oral BID [facility] Friend, PA-C   ondansetron 4 mg Intravenous Q6H PRN Christina Kathleen, PA-C   oxybutynin 10 mg Oral Daily Christina Kathleen, PA-C   polyethylene glycol 17 g Oral Daily Christina Kathleen, PA-C   risperiDONE 0 5 mg Oral BID Christina Kathleen, PA-C        Today, Patient Was Seen By: Nirmal Gross MD    ** Please Note: Dragon 360 Dictation voice to text software may have been used in the creation of this document   **

## 2019-07-31 NOTE — ASSESSMENT & PLAN NOTE
· Substitute home dose Mirabegron with Ditropan XL per formulary while inpt    No worsening cognitive status continue Ditropan for now

## 2019-07-31 NOTE — DISCHARGE SUMMARY
Discharge- Maryann Soto 4/20/1928, 80 y o  female MRN: [id number]    Unit/Bed#: -01 Encounter: [id number]    Primary Care Provider: Keith Guerra   Date and time admitted to hospital: 7/28/2019 10:42 PM        * Ambulatory dysfunction  Assessment & Plan  · Multiple falls over past couple month  Most recent on the evening of admission  · Daughter feels that her mother is no longer safe at home and would like to look into placement options  · PT/OT eval, patient cleared from orthopedic standpoint for concern for fracture  This appears to be arthritic changes  ·   ·     Tibial plateau fracture, left-resolved as of 7/31/2019  Assessment & Plan  Orthopedics consult will ruled out tibial plateau fracture  Patient with arthritis  Continue with physical therapy weight-bearing as tolerated    Dementia  Assessment & Plan  · Worsening dementia w/ increasing hallucinations per family  None noted by staff  Patient pleasantly confused  · Continue home dose Aricept, Cymbalta, Namenda and Risperidal      Essential hypertension  Assessment & Plan  · BP adequately controlled on current regimen  · Continue home dose Lotrel  · Monitor BP per unit protocol    Other hyperlipidemia  Assessment & Plan  · Fasted lipid panel well within normal limits  · Continue home dose Lipitor    Overactive bladder  Assessment & Plan  · Substitute home dose Mirabegron with Ditropan XL per formulary while inpt  No worsening cognitive status continue Ditropan for now    Okay to resume [place] at discharge          Discharging Physician / Practitioner: Elly Johnson MD  PCP: Keith Guerra  Admission Date:   Admission Orders (From admission, onward)     Ordered        07/29/19 1108  Inpatient Admission  Once         07/29/19 0138  Place in Observation  Once                   Discharge Date: 07/31/19    Resolved Problems  Date Reviewed: 7/31/2019          Resolved    Tibial plateau fracture, left 7/31/2019     Resolved by  Myrtice Drivers L Corona Mata MD          Consultations During Hospital Stay:  · Orthopedics    Procedures Performed:   · X-ray chest:  No acute cardiopulmonary disease  · X-ray left knee:  Linear radiodensity redness central and lateral proximal to feel raising impaction fracture question  Orthopedics reviewed they do not concur with this feel that this is arthritis  No further imaging recommended  · CT spine negative for acute fracture  Patient does have a large retro dense pannus consistent with her known rheumatoid arthritis putting her at risk for anesthesia and for fracture with trauma  Some roast of changes are present at the dens  · CT head:  No acute intracranial abnormality  Micro angiopathy changes    Significant Findings / Test Results:   · As above  · White count 8 53  · Hemoglobin 13 1  · Creatinine he 0 85  · Total cholesterol 131 LDL 61    Incidental Findings:   None    Test Results Pending at Discharge (will require follow up): · None     Outpatient Tests Requested:  · None    Complications:  None    Reason for Admission:  [facility] Course:     Vincent Orellana is a 80 y o  female patient who originally presented to the hospital on 7/28/2019 due to a fall at home  Patient has a known history of dementia  Her daughter reports worsening dementia symptoms and hallucinations  Patient was admitted to the hospital for further evaluation she was found to have swelling of the left knee x-ray suggested a possible fracture  She was seen evaluated by Orthopedics and determined not to have a tibial plateau fracture just arthritis  Patient has been working with the staff appropriately she does not appear to have significant hallucinations at impair her ability to be cared for  She has been sleeping and eating appropriately and her ambulation does require some rehabilitation  Patient will be discharged to rehab  Please see above list of diagnoses and related plan for additional information       Condition at Discharge: good     Discharge Day Visit / Exam:     Subjective:  Patient is pleasant and cooperative she is slightly hard of hearing  Vitals: Blood Pressure: 159/68 (07/31/19 0702)  Pulse: 72 (07/31/19 0702)  Temperature: 98 3 °F (36 8 °C) (07/31/19 0702)  Temp Source: Oral (07/31/19 1277)  Respirations: 18 (07/31/19 0702)  Height: 5' 4" (162 6 cm) (07/30/19 1156)  Weight - Scale: 64 kg (141 lb) (07/30/19 1155)  SpO2: 90 % (07/31/19 0702)  Exam:   Physical Exam  Generally well-developed, reasonably nourished female no acute distress normocephalic atraumatic pupils equal round and reactive to light extraocular muscles intact mucous membranes are moist neck is supple there is no JVD no lymph nodes no carotid bruits chest is decreased but clear to auscultation is no rhonchi rales or wheezes  Cardiovascular regular rate rhythm positive S1 and S2 heard appreciate an S3-S4 murmur or gallop  Abdomen is soft nontender nondistended with positive bowel sounds no hepatosplenomegaly no guarding or rebound  Neurologically patient is awake alert oriented to self only  cranial nerves 2-12 intact  Discussion with Family:  Daughter updated via Care Management I spoke with daughter yesterday regarding patient's medical stability  Discharge instructions/Information to patient and family:   See after visit summary for information provided to patient and family  Provisions for Follow-Up Care:  See after visit summary for information related to follow-up care and any pertinent home health orders  Disposition:     [facility] at [address] [place] [address],[facility] to [facility]:   · Not Applicable to this Patient - Not Applicable to this Patient    Planned Readmission:  None     Discharge Statement:  I spent 35 minutes discharging the patient  This time was spent on the day of discharge  I had direct contact with the patient on the day of discharge   Greater than 50% of the total time was spent examining patient, answering all patient questions, arranging and discussing plan of care with patient as well as directly providing post-discharge instructions  Additional time then spent on discharge activities  Discharge Medications:  See after visit summary for reconciled discharge medications provided to patient and family        ** Please Note: This note has been constructed using a voice recognition system **

## 2019-07-31 NOTE — SOCIAL WORK
CM spoke to daughter Pay[facility] accept, but [facility] will accept  [facility] transportation to be set up for 15:30  IMM already done  Medical Nec done and placed in pt's chart  Family was hoping for a closer facility, but are in agreement with discharge to [facility]  Has all necessary paperwork for the business office for [place] pending

## 2019-07-31 NOTE — TRANSPORTATION MEDICAL NECESSITY
Section I - General Information    Name of Patient: Ewelina Quiroga                 : 1928    Medicare #: [id number]  Transport Date: 19 (PCS is valid for round trips on this date and for all repetitive trips in the 60-day range as noted below )  Origin: [facility] 90: [facility]  Is the pt's stay covered under Medicare Part A (PPS/DRG)   []     Closest appropriate facility? If no, why is transport to more distant facility required? Yes  If hospice pt, is this transport related to pt's terminal illness? NA       Section II - Medical Necessity Questionnaire  Ambulance transportation is medically necessary only if other means of transport are contraindicated or would be potentially harmful to the patient  To meet this requirement, the patient must either be "bed confined" or suffer from a condition such that transport by means other than ambulance is contraindicated by the patient's condition  The following questions must be answered by the medical professional signing below for this form to be valid:    1)  Describe the MEDICAL CONDITION (physical and/or mental) of this patient AT [address] [place] [address] that requires the patient to be transported in an ambulance and why transport by other means is contraindicated by the patient's condition ambulatory dysfunction, dementia, fall, hallucinations,tibial plateau fracture, head injury    2) Is the patient "bed confined" as defined below? Yes  To be "be confined" the patient must satisfy all three of the following conditions: (1) unable to get up from bed without Assistance; AND (2) unable to ambulate; AND (3) unable to sit in a chair or wheelchair  3) Can this patient safely be transported by car or wheelchair van (i e , seated during transport without a medical attendant or monitoring)?    No    4) In addition to completing questions 1-3 above, please check any of the following conditions that apply*:   *Note: supporting documentation for any boxes checked must be maintained in the patient's medical records  If hosp-hosp transfer, describe services needed at 2nd facility not available at 1st facility? Patient is confused  Medical attendant required   Unable to tolerate seated position for time needed to transport       Section III - Signature of Physician or Healthcare Professional  I certify that the above information is true and correct based on my evaluation of this patient, and represent that the patient requires transport by ambulance and that other forms of transport are contraindicated  I understand that this information will be used by the Centers for Medicare and Medicaid Services (CMS) to support the determination of medical necessity for ambulance services, and I represent that I have personal knowledge of the patient's condition at time of transport  []  If this box is checked, I also certify that the patient is physically or mentally incapable of signing the ambulance service's claim and that the institution with which I am affiliated has furnished care, services, or assistance to the patient  My signature below is made on behalf of the patient pursuant to 42 CFR §424 36(b)(4)  In accordance with 42 CFR §424 37, the specific reason(s) that the patient is physically or mentally incapable of signing the claim form is as follows: n/a      Signature of Physician* or Healthcare Professional______________________________________________________________  Signature Date 07/31/19 (For scheduled repetitive transports, this form is not valid for transports performed more than 60 days after this date)    Printed Name & Credentials of Physician or Healthcare Professional (MD, DO, RN, etc )____Alisa Randall RN____________________________  *Form must be signed by patient's attending physician for scheduled, repetitive transports   For non-repetitive, unscheduled ambulance transports, if unable to obtain the signature of the attending physician, any of the following may sign (choose appropriate option below)  [] Physician Assistant []  Clinical Nurse Specialist [x]  Registered Nurse  []  Nurse Practitioner  [x] Discharge Planner

## 2019-07-31 NOTE — PLAN OF CARE
Problem: PHYSICAL THERAPY ADULT  Goal: Performs mobility at highest level of function for planned discharge setting  See evaluation for individualized goals  Description  Treatment/Interventions: Spoke to MD, Continued evaluation, Spoke to nursing, Bed mobility, Equipment eval/education, Patient/family training, Cognitive reorientation, Spoke to case management, OT  Equipment Recommended: (TBD)       See flowsheet documentation for full assessment, interventions and recommendations  Outcome: Progressing  Note:   Prognosis: Fair  Problem List: Decreased strength, Decreased endurance, Impaired balance, Decreased mobility, Decreased cognition, Impaired judgement, Decreased safety awareness, Orthopedic restrictions, Impaired sensation  Assessment: Pt seen for PT treatment session this date with interventions consisting of gait training w/ emphasis on improving pt's ability to ambulate level surfaces x 30' with min A provided by therapist with RW and Therapeutic exercise consisting of: AROM 10 reps B LE in sitting position  Pt agreeable to PT treatment session upon arrival, pt found seated OOB in recliner, in no apparent distress, A&O x 1 and responsive  In comparison to previous session, pt with improvements in household gait distance trial without overt LOB, initiation to B LB exercises to tolerance without pain reported  Frequent vc for redirection required to task  Post session: pt returned back to recliner, chair alarm engaged, all needs in reach and RN notified of session findings/recommendations  Continue to recommend STR at time of d/c in order to maximize pt's functional independence and safety w/ mobility  Pt continues to be functioning below baseline level, and remains limited 2* factors listed above  PT will continue to see pt while here in order to address the deficits listed above and provide interventions consistent w/ POC in effort to achieve STGs    Barriers to Discharge: Inaccessible home environment, Decreased caregiver support     Recommendation: Short-term skilled PT     PT - OK to Discharge: Yes(when medically cleared if to STR)    See flowsheet documentation for full assessment

## 2019-07-31 NOTE — ASSESSMENT & PLAN NOTE
· Multiple falls over past couple month  Most recent on the evening of admission  · Daughter feels that her mother is no longer safe at home and would like to look into placement options  · PT/OT eval, patient cleared from orthopedic standpoint for concern for fracture  This appears to be arthritic changes    ·   ·

## 2019-07-31 NOTE — ASSESSMENT & PLAN NOTE
· Multiple falls over past couple month  Most recent on the evening of admission  · Daughter feels that her mother is no longer safe at home and would like to look into placement options  · PT/OT eval, patient cleared from orthopedic standpoint for concern for fracture  This appears to be arthritic changes    · CM consult for discharge planning  · Up with assistance only

## 2019-07-31 NOTE — ASSESSMENT & PLAN NOTE
· Substitute home dose Mirabegron with Ditropan XL per formulary while inpt  No worsening cognitive status continue Ditropan for now    Okay to resume [place] at discharge

## 2019-07-31 NOTE — ASSESSMENT & PLAN NOTE
· Worsening dementia w/ increasing hallucinations per family  None noted by staff  Patient pleasantly confused    · Continue home dose Aricept, Cymbalta, Namenda and Risperidal

## 2019-07-31 NOTE — ASSESSMENT & PLAN NOTE
Orthopedics consult will ruled out tibial plateau fracture  Patient with arthritis    Continue with physical therapy weight-bearing as tolerated

## 2019-07-31 NOTE — PHYSICAL THERAPY NOTE
Physical Therapy Treatment Note       07/31/19 1611   Pain Assessment   Pain Assessment No/denies pain   Pain Score No Pain   Restrictions/Precautions   Weight Bearing Precautions Per Order Yes   RLE Weight Bearing Per Order WBAT  (per ortho note)   LLE Weight Bearing Per Order WBAT  (per ortho note)   Other Precautions Cognitive; Chair Alarm; Bed Alarm; Fall Risk   General   Chart Reviewed Yes   Additional Pertinent History per ortho assessment on 7/29/19: "79 y/o with know osteoarthritis left knee  No significant pain complaint currently  X-ray negative for fracture on my reading  Patient with good active ROM of knee without pain  Knee stable to varus and valgus stress and anterior posterior drawer "   Response to Previous Treatment Patient with no complaints from previous session  Family/Caregiver Present Yes   Cognition   Overall Cognitive Status Impaired   Arousal/Participation Alert; Cooperative   Attention Attends with cues to redirect   Orientation Level Oriented to person;Disoriented to place; Disoriented to time;Disoriented to situation   Memory Decreased recall of biographical information;Decreased short term memory;Decreased recall of recent events;Decreased recall of precautions   Following Commands Follows one step commands with increased time or repetition   Comments pt agreeable to PT session   Subjective   Subjective "I will walk"   Bed Mobility   Additional Comments pt received OOB to recliner upon arrival; RN reports pt has been ambulatory to/from bathroom with Ax1  Pt c/o L calf cramp- ALLEGRA Vinson notified of such   Transfers   Sit to Stand 4  Minimal assistance   Additional items Assist x 1; Increased time required;Verbal cues;Armrests   Stand to Sit 4  Minimal assistance   Additional items Assist x 1; Increased time required;Verbal cues;Armrests   Additional Comments vc for sequencing and technique for sit<>stand attempts   Ambulation/Elevation   Gait pattern Decreased foot clearance; Short stride; Step to   Gait Assistance 4  Minimal assist   Additional items Assist x 1;Verbal cues; Tactile cues  (for RW management, sequencing, posture)   Assistive Device Rolling walker   Distance 30'   Stair Management Assistance Not tested   Balance   Static Sitting Good   Dynamic Sitting Fair +   Static Standing Fair   Dynamic Standing Fair -   Ambulatory Poor +   Endurance Deficit   Endurance Deficit Yes   Activity Tolerance   Activity Tolerance Patient limited by fatigue   Nurse Made Aware RN [address] verbalized pt appropriate to see, made aware of session outcome/recs  ALLEGRA Buchanan made aware of pt's report of L Calf cramp- resolved   Exercises   Heelslides Sitting;10 reps;AROM; Bilateral   Knee AROM Short Arc Quad Sitting;10 reps;AROM; Bilateral   Knee AROM Long Arc Quad Sitting;10 reps;AROM; Bilateral   Ankle Pumps Sitting;10 reps;AROM; Bilateral   Marching Sitting;10 reps;AROM; Bilateral   Assessment   Prognosis Fair   Problem List Decreased strength;Decreased endurance; Impaired balance;Decreased mobility; Decreased cognition; Impaired judgement;Decreased safety awareness;Orthopedic restrictions; Impaired sensation   Assessment Pt seen for PT treatment session this date with interventions consisting of gait training w/ emphasis on improving pt's ability to ambulate level surfaces x 30' with min A provided by therapist with RW and Therapeutic exercise consisting of: AROM 10 reps B LE in sitting position  Pt agreeable to PT treatment session upon arrival, pt found seated OOB in recliner, in no apparent distress, A&O x 1 and responsive  In comparison to previous session, pt with improvements in household gait distance trial without overt LOB, initiation to B LB exercises to tolerance without pain reported  Frequent vc for redirection required to task  Post session: pt returned back to recliner, chair alarm engaged, all needs in reach and RN notified of session findings/recommendations   Continue to recommend STR at time of d/c in order to maximize pt's functional independence and safety w/ mobility  Pt continues to be functioning below baseline level, and remains limited 2* factors listed above  PT will continue to see pt while here in order to address the deficits listed above and provide interventions consistent w/ POC in effort to achieve STGs  Barriers to Discharge Inaccessible home environment;Decreased caregiver support   Goals   Patient Goals none expressed by pt; family goal for STR   STG Expiration Date 08/08/19   Short Term Goal #2 Additional: Perform all transfers with SBA to improve independence, Ambulate > 150 ft  with least restrictive assistive device with SBA w/o LOB and w/ normalized gait pattern 100% of the time, Increase all balance 1/2 grade to decrease risk for falls and PT provider will perform functional balance assessment to determine fall risk   Treatment Day 1   Plan   Treatment/Interventions Functional transfer training;LE strengthening/ROM; Therapeutic exercise; Endurance training;Patient/family training;Equipment eval/education; Bed mobility;Gait training;Cognitive reorientation;Spoke to nursing;Spoke to case management; Family   Progress Slow progress, cognitive deficits   PT Frequency   (3-5x/wk)   Recommendation   Recommendation Short-term skilled PT   Equipment Recommended Walker  (RW)   PT - OK to Discharge Yes  (when medically cleared if to STR)       Cheryl Salas, PT, DPT    Time of PT treatment session: 9848-7083

## 2019-08-01 NOTE — NURSING NOTE
Pt loaded onto transport stretcher  15 family members present in room  Pt left with transport team on her way to [facility]  Pt denies any pain or discomfort

## 2020-04-29 ENCOUNTER — NURSING HOME VISIT (OUTPATIENT)
Dept: FAMILY MEDICINE CLINIC | Facility: CLINIC | Age: 85
End: 2020-04-29
Payer: COMMERCIAL

## 2020-04-29 DIAGNOSIS — E78.49 OTHER HYPERLIPIDEMIA: ICD-10-CM

## 2020-04-29 DIAGNOSIS — M19.90 OSTEOARTHRITIS, UNSPECIFIED OSTEOARTHRITIS TYPE, UNSPECIFIED SITE: ICD-10-CM

## 2020-04-29 DIAGNOSIS — F32.A DEPRESSION, UNSPECIFIED DEPRESSION TYPE: ICD-10-CM

## 2020-04-29 DIAGNOSIS — E55.9 VITAMIN D DEFICIENCY: ICD-10-CM

## 2020-04-29 DIAGNOSIS — E83.52 HYPERCALCEMIA: ICD-10-CM

## 2020-04-29 DIAGNOSIS — I10 ESSENTIAL HYPERTENSION: Primary | ICD-10-CM

## 2020-04-29 DIAGNOSIS — R26.2 AMBULATORY DYSFUNCTION: ICD-10-CM

## 2020-04-29 DIAGNOSIS — N32.81 OVERACTIVE BLADDER: ICD-10-CM

## 2020-04-29 DIAGNOSIS — F03.91 DEMENTIA WITH BEHAVIORAL DISTURBANCE, UNSPECIFIED DEMENTIA TYPE (HCC): ICD-10-CM

## 2020-04-29 PROCEDURE — 99309 SBSQ NF CARE MODERATE MDM 30: CPT | Performed by: INTERNAL MEDICINE

## 2020-06-10 ENCOUNTER — NURSING HOME VISIT (OUTPATIENT)
Dept: FAMILY MEDICINE CLINIC | Facility: CLINIC | Age: 85
End: 2020-06-10
Payer: COMMERCIAL

## 2020-06-10 VITALS
WEIGHT: 117.4 LBS | SYSTOLIC BLOOD PRESSURE: 129 MMHG | OXYGEN SATURATION: 100 % | BODY MASS INDEX: 20.15 KG/M2 | TEMPERATURE: 98.5 F | HEART RATE: 76 BPM | DIASTOLIC BLOOD PRESSURE: 85 MMHG

## 2020-06-10 DIAGNOSIS — F03.91 DEMENTIA WITH BEHAVIORAL DISTURBANCE, UNSPECIFIED DEMENTIA TYPE (HCC): ICD-10-CM

## 2020-06-10 DIAGNOSIS — E55.9 VITAMIN D DEFICIENCY: ICD-10-CM

## 2020-06-10 DIAGNOSIS — I10 ESSENTIAL HYPERTENSION: ICD-10-CM

## 2020-06-10 DIAGNOSIS — M19.90 OSTEOARTHRITIS, UNSPECIFIED OSTEOARTHRITIS TYPE, UNSPECIFIED SITE: Primary | ICD-10-CM

## 2020-06-10 DIAGNOSIS — F32.A DEPRESSION, UNSPECIFIED DEPRESSION TYPE: ICD-10-CM

## 2020-06-10 DIAGNOSIS — N32.81 OVERACTIVE BLADDER: ICD-10-CM

## 2020-06-10 DIAGNOSIS — E78.49 OTHER HYPERLIPIDEMIA: ICD-10-CM

## 2020-06-10 PROCEDURE — 99309 SBSQ NF CARE MODERATE MDM 30: CPT | Performed by: INTERNAL MEDICINE

## 2020-08-14 ENCOUNTER — NURSING HOME VISIT (OUTPATIENT)
Dept: FAMILY MEDICINE CLINIC | Facility: CLINIC | Age: 85
End: 2020-08-14
Payer: COMMERCIAL

## 2020-08-14 DIAGNOSIS — E83.52 HYPERCALCEMIA: ICD-10-CM

## 2020-08-14 DIAGNOSIS — E55.9 VITAMIN D DEFICIENCY: ICD-10-CM

## 2020-08-14 DIAGNOSIS — E78.49 OTHER HYPERLIPIDEMIA: ICD-10-CM

## 2020-08-14 DIAGNOSIS — F01.50 VASCULAR DEMENTIA WITHOUT BEHAVIORAL DISTURBANCE (HCC): ICD-10-CM

## 2020-08-14 DIAGNOSIS — I10 ESSENTIAL HYPERTENSION: Primary | ICD-10-CM

## 2020-08-14 DIAGNOSIS — F32.0 CURRENT MILD EPISODE OF MAJOR DEPRESSIVE DISORDER WITHOUT PRIOR EPISODE (HCC): ICD-10-CM

## 2020-08-14 DIAGNOSIS — N32.81 OVERACTIVE BLADDER: ICD-10-CM

## 2020-08-14 PROCEDURE — 99309 SBSQ NF CARE MODERATE MDM 30: CPT | Performed by: INTERNAL MEDICINE

## 2020-08-16 NOTE — PROGRESS NOTES
[place], [place], [address]      NAME: Federico Stevens  AGE: 80 y o  SEX: female [id number]    DATE OF ENCOUNTER: 8/14/2020    Assessment and Plan     Problem List Items Addressed This Visit        Cardiovascular and Mediastinum    Essential hypertension - Primary     Continu Lotrel  Blood pressure has been within acceptable parameters  Nervous and Auditory    Dementia (HCC)     Continue Aricept and memantine  Patient has been stable  Her Seroquel was discontinued as private dose reduction  She has not had any hallucinations or behavioral change  Genitourinary    Overactive bladder     Continue Mirabegron  She has been stable without any symptoms or concerns            Other    Other hyperlipidemia     Patient's cholesterol levels have been well controlled and she has her statin discontinued         Depression     Continue Cymbalta  No depressive symptoms         Vitamin D deficiency     Continue vitamin-D         Hypercalcemia     Continue to monitor calcium levels             Chief Complaint     Chief Complaint   Patient presents with    Dementia       History of Present Illness     Patient seen and examined  She denies any nausea vomiting or diarrhea  No chest pain or shortness of breath  Appetite has been stable  Sleeping well  The following portions of the patient's history were reviewed and updated as appropriate: allergies, current medications, past family history, past medical history, past social history, past surgical history and problem list     Review of Systems     Review of Systems   All other systems reviewed and are negative        Active Problem List     Patient Active Problem List   Diagnosis    Ambulatory dysfunction    Dementia ([address] )    Essential hypertension    Other hyperlipidemia    Overactive bladder    DJD (degenerative joint disease)    Depression    Vitamin D deficiency    Hypercalcemia Objective     /76   Pulse 72   Temp (!) 97 2 °F (36 2 °C)   Resp 20   Wt 51 3 kg (113 lb)   BMI 19 40 kg/m²     Physical Exam  Constitutional:       Appearance: Normal appearance  She is well-developed and normal weight  HENT:      Head: Normocephalic and atraumatic  Right Ear: Tympanic membrane normal       Left Ear: Tympanic membrane normal       Nose: Nose normal       Mouth/Throat:      Mouth: Mucous membranes are moist    Eyes:      Extraocular Movements: Extraocular movements intact  Conjunctiva/sclera: Conjunctivae normal       Pupils: Pupils are equal, round, and reactive to light  Neck:      Musculoskeletal: Neck supple  Cardiovascular:      Rate and Rhythm: Normal rate and regular rhythm  Heart sounds: Normal heart sounds  Pulmonary:      Effort: Pulmonary effort is normal       Breath sounds: Normal breath sounds  Abdominal:      General: Bowel sounds are normal       Palpations: Abdomen is soft  Musculoskeletal:      Comments: Generalized weakness   Skin:     General: Skin is warm and dry  Capillary Refill: Capillary refill takes less than 2 seconds  Neurological:      Mental Status: She is alert  Mental status is at baseline  Psychiatric:         Mood and Affect: Mood normal          Behavior: Behavior normal          Pertinent Laboratory/Diagnostic Studies:    CHOLESTEROL   ·  216 mg/dL <200 H Final             TRIGLYCERIDE   ·  130 mg/dL <150  Final             CHOL, HDL, DIRECT   ·  46 mg/dL >40  Final             CHOL, NON-HDL   ·  170 mg/dL <160 H Final     Note: For NCEP interpretive guidelines please refer to the Laboratory   Handbook         CHOL, LDL, CALC   ·  144 mg/dL <130 H Final     LDL Cholesterol was calculated using the Friedewald equation   Direct   measurement of LDL is not indicated for this patient based on [facility]'s   analytical algorithm for measurement of LDL Cholesterol         CHOL/HDL RATIO   ·  4 70    Final     Relative Risk 1/2 Average Risk          3 27   Average Risk              4 44   2X Average Risk           7 05   3X Average Risk          11 04       THYROID STIM HORMONE   ·  1 00 uIU/mL 0 36-3 74  Final           VITAMIN B12   ·  659 pg/mL >211  Final           VITAMIN D, 25-OH   ·  41 ng/mL   Final     Interpretive information: Total Vitamin D, 25-Hydroxy                                              This assay quantifies the sum of vitamin D3,   25-hydroxy and vitamin D2, 25-hydroxy                                                  <10    ng/mL  Deficiency     10-30  ng/mL  Insufficiency      ng/mL  Sufficiency     >100   ng/mL  Toxicity       7/13/2020  Hemoglobin 13 9  Hematocrit 41 6  WBC 7 9  Platelets 929    Glucose 61  BUN 13  Creatinine 0 60  Sodium 144  Potassium 4 1  Chloride 112  Carbon dioxide 25  AST 16  ALT 22 the    Current Medications     Current Outpatient Medications:     amLODIPine-benazepril (LOTREL 5-20) 5-20 MG per capsule, Take 1 capsule by mouth daily, Disp: , Rfl:     aspirin 81 mg chewable tablet, Chew 81 mg daily, Disp: , Rfl:     cholecalciferol (VITAMIN D3) 1,000 units tablet, Take 3,000 Units by mouth daily, Disp: , Rfl:     docusate sodium (COLACE) 100 mg capsule, Take 100 mg by mouth 2 (two) times a day, Disp: , Rfl:     donepezil (ARICEPT) 10 mg tablet, Take 10 mg by mouth daily at bedtime, Disp: , Rfl:     DULoxetine (CYMBALTA) 60 mg delayed release capsule, Take 60 mg by mouth daily, Disp: , Rfl:     Melatonin 5 MG CAPS, Take 5 mg by mouth daily at bedtime, Disp: , Rfl:     memantine (NAMENDA) 10 mg tablet, Take 10 mg by mouth 2 (two) times a day, Disp: , Rfl:     Mirabegron ER 50 MG TB24, Take 50 mg by mouth daily, Disp: , Rfl:

## 2020-08-18 VITALS
TEMPERATURE: 97.2 F | WEIGHT: 113 LBS | RESPIRATION RATE: 20 BRPM | HEART RATE: 72 BPM | DIASTOLIC BLOOD PRESSURE: 76 MMHG | SYSTOLIC BLOOD PRESSURE: 127 MMHG | BODY MASS INDEX: 19.4 KG/M2

## 2020-08-18 PROBLEM — R26.2 AMBULATORY DYSFUNCTION: Status: RESOLVED | Noted: 2019-07-29 | Resolved: 2020-08-18

## 2020-08-18 RX ORDER — MELATONIN
3000 DAILY
COMMUNITY
End: 2020-09-22 | Stop reason: ALTCHOICE

## 2020-08-18 RX ORDER — DOCUSATE SODIUM 100 MG/1
100 CAPSULE, LIQUID FILLED ORAL 2 TIMES DAILY
COMMUNITY

## 2020-08-18 NOTE — ASSESSMENT & PLAN NOTE
Continue Aricept and memantine  Patient has been stable  Her Seroquel was discontinued as private dose reduction  She has not had any hallucinations or behavioral change

## 2020-08-20 ENCOUNTER — APPOINTMENT (EMERGENCY)
Dept: RADIOLOGY | Facility: HOSPITAL | Age: 85
End: 2020-08-20
Payer: MEDICARE

## 2020-08-20 ENCOUNTER — HOSPITAL ENCOUNTER (EMERGENCY)
Facility: HOSPITAL | Age: 85
Discharge: NURSING, REHAB OR LONG-TERM CARE | End: 2020-08-20
Attending: SURGERY
Payer: MEDICARE

## 2020-08-20 VITALS
OXYGEN SATURATION: 98 % | RESPIRATION RATE: 20 BRPM | WEIGHT: 116.84 LBS | HEART RATE: 68 BPM | SYSTOLIC BLOOD PRESSURE: 154 MMHG | DIASTOLIC BLOOD PRESSURE: 87 MMHG | TEMPERATURE: 98.2 F

## 2020-08-20 PROCEDURE — 99285 EMERGENCY DEPT VISIT HI MDM: CPT

## 2020-08-20 PROCEDURE — NC001 PR NO CHARGE: Performed by: SURGERY

## 2020-08-20 PROCEDURE — NC001 PR NO CHARGE: Performed by: EMERGENCY MEDICINE

## 2020-08-20 PROCEDURE — 99284 EMERGENCY DEPT VISIT MOD MDM: CPT | Performed by: SURGERY

## 2020-08-20 PROCEDURE — 72125 CT NECK SPINE W/O DYE: CPT

## 2020-08-20 PROCEDURE — 70450 CT HEAD/BRAIN W/O DYE: CPT

## 2020-08-20 NOTE — H&P
H&P Exam - Trauma   Ilia Warner 80 y o  female MRN: [id number]  Unit/Bed#: TR 02 Encounter: [id number]    Assessment/Plan   Trauma Alert: Level B  Model of Arrival: Ambulance  Trauma Team: Attending Delano Reynoso, Fellow Yordan Doyle and LUCERO Kim  Consultants: None    Trauma Active Problems: S/P Fall times 2, possible CVA  Right sided weakness    Trauma Plan: Reported fall times two  GCS - 14, scans negative  Return to facility    Chief Complaint: none    History of Present Illness   HPI:  Ilia Warner is a 80 y o  female who presents as a possible stroke vs trauma with fall x2  Reported right facial droop however on arrival no droop observed  Following commands, RLe and RUE is slightly weaker then the left side  Answers questions appropriately  Mechanism:Fall    Review of Systems   Constitutional: Positive for activity change  HENT: Negative  Eyes: Negative  Respiratory: Negative  Cardiovascular: Negative  Gastrointestinal: Negative  Endocrine: Negative  Genitourinary: Negative  Musculoskeletal: Negative  Skin: Negative  Allergic/Immunologic: Negative  Neurological: Positive for weakness  Left side weaker then right   Hematological: Negative  Psychiatric/Behavioral: Negative  12-point, complete review of systems was reviewed and negative except as stated above  Historical Information   History is obtainable from the patient to an extent, does answer questions, positive sense of humor  Efforts to obtain history included the following sources: EMS    History reviewed  No pertinent past medical history  History reviewed  No pertinent surgical history    Social History   Social History     Substance and Sexual Activity   Alcohol Use None     Social History     Substance and Sexual Activity   Drug Use Not on file     Social History     Tobacco Use   Smoking Status Not on file     E-Cigarette/Vaping     E-Cigarette/Vaping Substances       There is no immunization history on file for this patient  Last Tetanus: unknown  Family History: Non-contributory        Meds/Allergies   all current active meds have been reviewed    No Known Allergies      PHYSICAL EXAM      Objective   Vitals:   First set: Temperature: 98 2 °F (36 8 °C) (08/20/20 1005)  Pulse: 61 (08/20/20 1005)  Respirations: 16 (08/20/20 1005)  Blood Pressure: 144/83 (08/20/20 1005)    Primary Survey:   (A) Airway: patent  (B) Breathing: non-labored  (C) Circulation: Pulses:   normal  (D) Disabliity:  GCS Total:  14, Eye Opening:   Spontaneous = 4, Motor Response: Obeys commands = 6 and Verbal Response:  Confused = 4  (E) Expose:  Completed    Secondary Survey: (Click on Physical Exam tab above)  Physical Exam  HENT:      Head: Normocephalic and atraumatic  Nose: Nose normal       Mouth/Throat:      Mouth: Mucous membranes are dry  Pharynx: Oropharynx is clear  Neck:      Musculoskeletal: Normal range of motion and neck supple  Cardiovascular:      Rate and Rhythm: Normal rate and regular rhythm  Pulmonary:      Effort: Pulmonary effort is normal       Breath sounds: Normal breath sounds  Abdominal:      General: Abdomen is flat  Bowel sounds are normal       Palpations: Abdomen is soft  Genitourinary:     Rectum: Normal    Musculoskeletal: Normal range of motion  Comments: Weaker left and right side   Skin:     General: Skin is warm and dry  Capillary Refill: Capillary refill takes less than 2 seconds  Neurological:      General: No focal deficit present     Psychiatric:         Mood and Affect: Mood normal          Invasive Devices     Peripheral Intravenous Line            Peripheral IV 08/20/20 Left Hand less than 1 day                Lab Results: none  Imaging/EKG Studies: CXR - neg  HCT - neg  CT C-spine - neg  Other Studies: none    Code Status: No Order  Advance Directive and Living Will:      Power of :    POLST:

## 2020-08-20 NOTE — DISCHARGE SUMMARY
Discharge Summary - Nano Cheema 80 y o  female MRN: [id number]    Unit/Bed#: ED 26 Encounter: [id number]    Admission Date:     Admitting Diagnosis: Other injury of unspecified body region, initial encounter [T14  8XXA]    HPI: Nano Cheema is a 80 y o  female who presents as a possible stroke vs trauma with fall x2  Reported right facial droop however on arrival no droop observed  Following commands, RLe and RUE is slightly weaker then the left side  Answers questions appropriately        Procedures Performed: No orders of the defined types were placed in this encounter  Summary of Hospital Course: Level B trauma alert  Report possible fall times 2, this morning was being evaluated and NP reported right facial droop  HR from 80's to 40's  Scans negative for injury, will return to facility and follow up with PCP  Significant Findings, Care, Treatment and Services Provided: Trauma - Ct Head Wo Contrast    Result Date: 8/20/2020  Impression: No acute intracranial CT abnormality  I personally discussed this study with Dr Colin Garvin on 8/20/2020 at 10:34 AM   Workstation performed: [id number]     Trauma - Ct Spine Cervical Wo Contrast    Result Date: 8/20/2020  Impression: No acute fracture or traumatic malalignment  I personally discussed this study with Dr Colin Garvin on 8/20/2020 at 10:34 AM  Workstation performed: [id number]     Xr Chest 1 View    Result Date: 8/20/2020  Impression: No acute cardiopulmonary disease within limitations of supine imaging  Workstation performed: [id number]     Xr Trauma Multiple    Result Date: 8/20/2020  Impression: No acute cardiopulmonary disease within limitations of supine imaging   Workstation performed: [id number]       Complications: none    Discharge Diagnosis: S/P Fall  No traumatic injuries    Resolved Problems  Date Reviewed: 8/20/2020    None          Condition at Discharge: fair         Discharge instructions/Information to patient and family:   See after visit summary for information provided to patient and family  Provisions for Follow-Up Care:  See after visit summary for information related to follow-up care and any pertinent home health orders  PCP: Aishwarya Chamberlain    Disposition: [facility]    Planned Readmission: No      Discharge Statement   I spent 25 minutes discharging the patient  This time was spent on the day of discharge  I had direct contact with the patient on the day of discharge  Additional documentation is required if more than 30 minutes were spent on discharge  Discharge Medications:  See after visit summary for reconciled discharge medications provided to patient and family

## 2020-08-20 NOTE — ED PROVIDER NOTES
Emergency Department Airway Evaluation and Management Form    History  Obtained from: EMS  Patient has no known allergies  Chief Complaint   Patient presents with    Trauma     Pt from [facility]  Per staff pt had 2 falls overnight and had R sided facial droop this morning  LKW 2300 8/19/2020  Fall, head injuyr  R sided deficits  ?comfort care? Has DNR/DNI/DNH on chart from NH  History reviewed  No pertinent past medical history  History reviewed  No pertinent surgical history  History reviewed  No pertinent family history  Social History     Tobacco Use    Smoking status: Not on file   Substance Use Topics    Alcohol use: Not on file    Drug use: Not on file     I have reviewed and agree with the history as documented  Review of Systems    Physical Exam  /87   Pulse 68   Temp 98 2 °F (36 8 °C)   Resp 20   Wt 53 kg (116 lb 13 5 oz)   SpO2 98%     Physical Exam    ED Medications  Medications   sodium chloride 0 9 % bolus (500 mL Intravenous New Bag 8/20/20 1009)     Intubation  Procedures    Notes  Airway stable, no acute intervention      Final Diagnosis  Final diagnoses:   None     ED Provider  Electronically Signed by     Derrick Arenas MD  08/20/20 0712

## 2020-08-20 NOTE — SOCIAL WORK
CM responded to trauma alert  Pt was brought to the ED via [facility] s/p fall last evening at [facility]  CM spoke to pt's dtr, who explained the pt is DNR/DNI but isn't on hospice using palliative care  Dtr would like pt [facility] are [facility]  Pt is [facility]  [facility] will transport @3365  Pt's dtr notified

## 2020-09-10 ENCOUNTER — NURSING HOME VISIT (OUTPATIENT)
Dept: FAMILY MEDICINE CLINIC | Facility: CLINIC | Age: 85
End: 2020-09-10
Payer: COMMERCIAL

## 2020-09-10 VITALS
BODY MASS INDEX: 19.02 KG/M2 | WEIGHT: 110.8 LBS | RESPIRATION RATE: 18 BRPM | TEMPERATURE: 97.3 F | DIASTOLIC BLOOD PRESSURE: 77 MMHG | HEART RATE: 70 BPM | OXYGEN SATURATION: 97 % | SYSTOLIC BLOOD PRESSURE: 132 MMHG

## 2020-09-10 DIAGNOSIS — F32.0 CURRENT MILD EPISODE OF MAJOR DEPRESSIVE DISORDER WITHOUT PRIOR EPISODE (HCC): ICD-10-CM

## 2020-09-10 DIAGNOSIS — N32.81 OVERACTIVE BLADDER: ICD-10-CM

## 2020-09-10 DIAGNOSIS — I10 ESSENTIAL HYPERTENSION: Primary | ICD-10-CM

## 2020-09-10 DIAGNOSIS — M19.90 OSTEOARTHRITIS, UNSPECIFIED OSTEOARTHRITIS TYPE, UNSPECIFIED SITE: ICD-10-CM

## 2020-09-10 DIAGNOSIS — F01.50 VASCULAR DEMENTIA WITHOUT BEHAVIORAL DISTURBANCE (HCC): ICD-10-CM

## 2020-09-10 DIAGNOSIS — E55.9 VITAMIN D DEFICIENCY: ICD-10-CM

## 2020-09-10 PROCEDURE — 99308 SBSQ NF CARE LOW MDM 20: CPT | Performed by: INTERNAL MEDICINE

## 2020-09-11 NOTE — PROGRESS NOTES
Progress Note    Patient location:  [facility]    Reason for visit:  Follow-up dementia, depression, vitamin-D deficiency, DJD, hypertension      Patients care was coordinated with nursing facility staff  [facility] vitals, labs and [facility]  Past Medical, surgical, social, medication and allergy history and patients previous records reviewed  Problem List Items Addressed This Visit        Cardiovascular and Mediastinum    Essential hypertension - Primary     BP stable on Lotrel            Nervous and Auditory    Dementia ([address] )     Continue supportive treatment  Pt remains on Donepezil and Memantine            Musculoskeletal and Integument    DJD (degenerative joint disease)     Continue PRN Tylenol            Genitourinary    Overactive bladder     Continue Mirabegron            Other    Depression     Pt remains on Duloxetine         Vitamin D deficiency     Continue Vitamin D supplements               HPI:   Patient is being seen for a follow-up visit today  In general she has been declining  Patient has lost about 14 lb since earlier this year  Remains confused, no agitation or irritability  Patient is being followed by speech therapist   She is on pureed diet for aspiration concerns  Patient was recently started under hospice service  No reports of any fever chills or chest congestion  Review of Systems   Constitutional: Positive for fatigue and unexpected weight change (wt loss of 14 lbs during last 9 months)  Negative for chills and fever  HENT: Negative for nosebleeds and rhinorrhea  Eyes: Negative for discharge and redness  Respiratory: Negative for cough, shortness of breath, wheezing and stridor  Cardiovascular: Negative for chest pain and leg swelling  Gastrointestinal: Negative for abdominal distention, abdominal pain, diarrhea and vomiting  Genitourinary: Negative for hematuria     Musculoskeletal: Positive for gait problem  Negative for arthralgias and back pain  Skin: Negative for pallor  Neurological: Positive for weakness (Generalized)  Negative for tremors, seizures and syncope  Psychiatric/Behavioral: Positive for confusion  Negative for agitation and behavioral problems  Past Medical History:   Diagnosis Date    Dementia [address] [address] )     Hyperlipidemia     Hypertension        Past Surgical History:   Procedure Laterality Date    HYSTERECTOMY         Social History     Tobacco Use   Smoking Status Never Smoker   Smokeless Tobacco Never Used       Social History     Substance and Sexual Activity   Alcohol Use Never    Frequency: Never       Family History   Family history unknown: Yes        No Known Allergies      Current Outpatient Medications:     amLODIPine-benazepril (LOTREL 5-20) 5-20 MG per capsule, Take 1 capsule by mouth daily, Disp: , Rfl:     aspirin 81 mg chewable tablet, Chew 81 mg daily, Disp: , Rfl:     cholecalciferol (VITAMIN D3) 1,000 units tablet, Take 3,000 Units by mouth daily, Disp: , Rfl:     docusate sodium (COLACE) 100 mg capsule, Take 100 mg by mouth 2 (two) times a day, Disp: , Rfl:     donepezil (ARICEPT) 10 mg tablet, Take 10 mg by mouth daily at bedtime, Disp: , Rfl:     DULoxetine (CYMBALTA) 60 mg delayed release capsule, Take 60 mg by mouth daily, Disp: , Rfl:     Melatonin 5 MG CAPS, Take 5 mg by mouth daily at bedtime, Disp: , Rfl:     memantine (NAMENDA) 10 mg tablet, Take 10 mg by mouth 2 (two) times a day, Disp: , Rfl:     Mirabegron ER 50 MG TB24, Take 50 mg by mouth daily, Disp: , Rfl:     [facility]  Vitals:    09/10/20 2226   BP: 132/77   Pulse: 70   Resp: 18   Temp: (!) 97 3 °F (36 3 °C)   SpO2: 97%       Physical Exam  Constitutional:       General: She is not in acute distress  Appearance: She is well-developed  She is not diaphoretic  HENT:      Head: Normocephalic and atraumatic     Eyes: General:         Right eye: No discharge  Left eye: No discharge  Neck:      Musculoskeletal: Neck supple  Cardiovascular:      Rate and Rhythm: Normal rate and regular rhythm  Pulmonary:      Effort: No respiratory distress  Breath sounds: No stridor  No wheezing or rhonchi  Abdominal:      General: There is no distension  Palpations: Abdomen is soft  Tenderness: There is no abdominal tenderness  There is no guarding  Musculoskeletal:      Right lower leg: No edema  Left lower leg: No edema  Comments: Generalized muscle wasting noted   Skin:     Coloration: Skin is not jaundiced or pale  Neurological:      General: No focal deficit present  Mental Status: She is alert  Cranial Nerves: No cranial nerve deficit  Comments: confused         Diagnostic Data:    Recent labs were reviewed    On 08/27/2020 hemoglobin was 12 8, WBC count 6 4, BUN 15, creatinine 0 77, sodium 142, potassium 4 3, calcium 10 3    Additional Notes:   Pt for comfort care only

## 2020-09-13 RX ORDER — QUETIAPINE FUMARATE 25 MG/1
12.5 TABLET, FILM COATED ORAL
COMMUNITY
End: 2020-09-22 | Stop reason: ALTCHOICE

## 2020-09-18 ENCOUNTER — NURSING HOME VISIT (OUTPATIENT)
Dept: FAMILY MEDICINE CLINIC | Facility: CLINIC | Age: 85
End: 2020-09-18
Payer: MEDICARE

## 2020-09-18 DIAGNOSIS — Z51.5 COMFORT MEASURES ONLY STATUS: Primary | ICD-10-CM

## 2020-09-18 DIAGNOSIS — I10 ESSENTIAL HYPERTENSION: ICD-10-CM

## 2020-09-18 DIAGNOSIS — F32.0 CURRENT MILD EPISODE OF MAJOR DEPRESSIVE DISORDER, UNSPECIFIED WHETHER RECURRENT (HCC): ICD-10-CM

## 2020-09-18 DIAGNOSIS — F01.50 VASCULAR DEMENTIA WITHOUT BEHAVIORAL DISTURBANCE (HCC): ICD-10-CM

## 2020-09-18 PROCEDURE — 99309 SBSQ NF CARE MODERATE MDM 30: CPT | Performed by: NURSE PRACTITIONER

## 2020-09-19 DIAGNOSIS — F41.9 ANXIETY: Primary | ICD-10-CM

## 2020-09-19 RX ORDER — LORAZEPAM 0.5 MG/1
TABLET ORAL
Qty: 30 TABLET | Refills: 0 | Status: SHIPPED | OUTPATIENT
Start: 2020-09-19 | End: 2020-10-07 | Stop reason: ALTCHOICE

## 2020-09-20 NOTE — PROGRESS NOTES
Nursing Home Visit      NAME: Tanja oJy  AGE: 80 y o  SEX: female [id number]    DATE OF ENCOUNTER: 9/19/2020    Assessment and Plan     Problem List Items Addressed This Visit        Cardiovascular and Mediastinum    Essential hypertension     Continue Lotrel  BP stable            Nervous and Auditory    Dementia (HCC)     Continue Memantine  Patient was placed on hospice  Continue Morphine and Ativan         Relevant Medications    LORazepam (ATIVAN) 0 5 mg tablet       Other    Depression     Continue cymbalta         Relevant Medications    LORazepam (ATIVAN) 0 5 mg tablet    Comfort measures only status - Primary     Patient currently on hospice  Patient has periods of anxiety and impulsive  She had a fall and placed on a 1:1  Add Ativan patient less anxious  Sleeping and sitting up in the chair and feeding herself  Will attempt to wean 1:1 less anxious                 Chief Complaint     Dementia    History of Present Illness     Patient seen and examined  She has a history of falls  She has been on a 1:1  She is confused with periods of anxiety  She is alert to name but confused x 3  No nausea, vomiting or diarrhea  No chest pain, shortness of breath or palpitations  The following portions of the patient's history were reviewed and updated as appropriate: allergies, current medications, past family history, past medical history, past social history, past surgical history and problem list     Review of Systems     Review of Systems   Constitutional: Negative for appetite change and fever  HENT: Negative for congestion and trouble swallowing  Eyes: Negative for discharge and redness  Respiratory: Negative for cough, choking, shortness of breath, wheezing and stridor  Cardiovascular: Negative for palpitations and leg swelling  Gastrointestinal: Negative for abdominal distention, constipation, diarrhea and vomiting  Genitourinary: Negative for decreased urine volume     Musculoskeletal: Positive for gait problem  Skin: Negative for rash and wound  Neurological: Negative for tremors, seizures and weakness  Psychiatric/Behavioral: Positive for confusion and decreased concentration  Negative for agitation and behavioral problems  Active Problem List     Patient Active Problem List   Diagnosis    Dementia ([address] )    Essential hypertension    Other hyperlipidemia    Overactive bladder    DJD (degenerative joint disease)    Depression    Vitamin D deficiency    Hypercalcemia       Objective     T-97 7  P- 68  R-20  BP-138/80  O2 Sat 97%  Weight 110 lbs    Physical Exam  Constitutional:       General: She is not in acute distress  Appearance: She is normal weight  She is not ill-appearing, toxic-appearing or diaphoretic  HENT:      Head: Normocephalic and atraumatic  Right Ear: External ear normal       Left Ear: External ear normal       Nose: Nose normal  No congestion or rhinorrhea  Mouth/Throat:      Mouth: Mucous membranes are moist    Eyes:      General:         Right eye: No discharge  Left eye: No discharge  Pupils: Pupils are equal, round, and reactive to light  Neck:      Musculoskeletal: Normal range of motion and neck supple  No muscular tenderness  Cardiovascular:      Rate and Rhythm: Normal rate and regular rhythm  Pulses: Normal pulses  Heart sounds: Normal heart sounds  No murmur  No friction rub  No gallop  Pulmonary:      Effort: Pulmonary effort is normal  No respiratory distress  Breath sounds: Normal breath sounds  No stridor  No wheezing, rhonchi or rales  Chest:      Chest wall: No tenderness  Abdominal:      General: Bowel sounds are normal  There is no distension  Palpations: Abdomen is soft  Tenderness: There is no abdominal tenderness  Musculoskeletal: Normal range of motion  General: No swelling or tenderness  Skin:     General: Skin is warm and dry        Capillary Refill: Capillary refill takes less than 2 seconds  Coloration: Skin is not pale  Findings: No erythema, lesion or rash  Neurological:      Mental Status: She is alert  She is disoriented  Motor: Weakness present  Gait: Gait abnormal    Psychiatric:         Mood and Affect: Mood normal          Behavior: Behavior normal          Pertinent Laboratory/Diagnostic Studies:    No labs patient on hospice    Current Medications     Current Outpatient Medications:     amLODIPine-benazepril (LOTREL 5-20) 5-20 MG per capsule, Take 1 capsule by mouth daily, Disp: , Rfl:     aspirin 81 mg chewable tablet, Chew 81 mg daily, Disp: , Rfl:     cholecalciferol (VITAMIN D3) 1,000 units tablet, Take 3,000 Units by mouth daily, Disp: , Rfl:     docusate sodium (COLACE) 100 mg capsule, Take 100 mg by mouth 2 (two) times a day, Disp: , Rfl:     donepezil (ARICEPT) 10 mg tablet, Take 10 mg by mouth daily at bedtime, Disp: , Rfl:     DULoxetine (CYMBALTA) 60 mg delayed release capsule, Take 60 mg by mouth daily, Disp: , Rfl:     LORazepam (ATIVAN) 0 5 mg tablet, Giive 1/2 tab (0 25mg) every 12 hours   Hold for sedation, Disp: 30 tablet, Rfl: 0    Melatonin 5 MG CAPS, Take 5 mg by mouth daily at bedtime, Disp: , Rfl:     memantine (NAMENDA) 10 mg tablet, Take 10 mg by mouth 2 (two) times a day, Disp: , Rfl:     Mirabegron ER 50 MG TB24, Take 50 mg by mouth daily, Disp: , Rfl:     QUEtiapine (SEROquel) 25 mg tablet, Take 12 5 mg by mouth daily at bedtime, Disp: , Rfl:

## 2020-09-22 PROBLEM — Z51.5 COMFORT MEASURES ONLY STATUS: Status: ACTIVE | Noted: 2020-09-22

## 2020-09-22 RX ORDER — MORPHINE SULFATE 20 MG/ML
0.25 SOLUTION ORAL EVERY 6 HOURS PRN
COMMUNITY
End: 2020-10-07 | Stop reason: SDUPTHER

## 2020-09-22 RX ORDER — LORAZEPAM 0.5 MG/1
0.25 TABLET ORAL 2 TIMES DAILY
COMMUNITY
End: 2020-10-07 | Stop reason: ALTCHOICE

## 2020-09-22 NOTE — ASSESSMENT & PLAN NOTE
Patient currently on hospice  Patient has periods of anxiety and impulsive  She had a fall and placed on a 1:1  Add Ativan patient less anxious  Sleeping and sitting up in the chair and feeding herself   Will attempt to wean 1:1 less anxious

## 2020-10-04 ENCOUNTER — TELEPHONE (OUTPATIENT)
Dept: OTHER | Facility: OTHER | Age: 85
End: 2020-10-04

## 2020-10-05 ENCOUNTER — NURSING HOME VISIT (OUTPATIENT)
Dept: FAMILY MEDICINE CLINIC | Facility: CLINIC | Age: 85
End: 2020-10-05

## 2020-10-05 VITALS
WEIGHT: 110 LBS | TEMPERATURE: 97.3 F | SYSTOLIC BLOOD PRESSURE: 150 MMHG | BODY MASS INDEX: 18.88 KG/M2 | DIASTOLIC BLOOD PRESSURE: 77 MMHG | OXYGEN SATURATION: 96 % | RESPIRATION RATE: 18 BRPM | HEART RATE: 70 BPM

## 2020-10-05 DIAGNOSIS — I10 ESSENTIAL HYPERTENSION: Primary | ICD-10-CM

## 2020-10-05 DIAGNOSIS — Z51.5 COMFORT MEASURES ONLY STATUS: ICD-10-CM

## 2020-10-05 DIAGNOSIS — F01.50 VASCULAR DEMENTIA WITHOUT BEHAVIORAL DISTURBANCE (HCC): ICD-10-CM

## 2020-10-07 DIAGNOSIS — R52 PAIN: Primary | ICD-10-CM

## 2020-10-07 PROBLEM — M19.90 DJD (DEGENERATIVE JOINT DISEASE): Status: RESOLVED | Noted: 2020-04-29 | Resolved: 2020-10-07

## 2020-10-07 PROBLEM — N32.81 OVERACTIVE BLADDER: Status: RESOLVED | Noted: 2019-07-29 | Resolved: 2020-10-07

## 2020-10-07 RX ORDER — LORAZEPAM 2 MG/ML
0.5 CONCENTRATE ORAL
COMMUNITY
End: 2021-01-08 | Stop reason: SDUPTHER

## 2020-10-07 RX ORDER — MORPHINE SULFATE 20 MG/ML
0.25 SOLUTION ORAL EVERY 6 HOURS PRN
Qty: 30 ML | Refills: 0 | Status: SHIPPED | OUTPATIENT
Start: 2020-10-07 | End: 2020-11-06

## 2020-10-07 RX ORDER — DULOXETIN HYDROCHLORIDE 20 MG/1
20 CAPSULE, DELAYED RELEASE ORAL DAILY
COMMUNITY
End: 2021-01-08

## 2020-11-18 RX ORDER — MORPHINE SULFATE 20 MG/5ML
0.25 SOLUTION ORAL EVERY 6 HOURS PRN
COMMUNITY
End: 2021-01-08

## 2020-11-18 RX ORDER — ACETAMINOPHEN 325 MG/1
650 TABLET ORAL EVERY 6 HOURS PRN
COMMUNITY

## 2020-11-18 RX ORDER — MAGNESIUM CARB/ALUMINUM HYDROX 105-160MG
120 TABLET,CHEWABLE ORAL ONCE
COMMUNITY

## 2020-11-18 RX ORDER — TROLAMINE SALICYLATE 10 G/100G
CREAM TOPICAL AS NEEDED
COMMUNITY

## 2020-11-18 RX ORDER — TRAMADOL HYDROCHLORIDE 50 MG/1
50 TABLET ORAL EVERY 12 HOURS PRN
COMMUNITY
End: 2021-01-08

## 2020-12-07 ENCOUNTER — TELEPHONE (OUTPATIENT)
Dept: OTHER | Facility: OTHER | Age: 85
End: 2020-12-07

## 2020-12-25 ENCOUNTER — TELEPHONE (OUTPATIENT)
Dept: OTHER | Facility: OTHER | Age: 85
End: 2020-12-25

## 2020-12-26 ENCOUNTER — TELEPHONE (OUTPATIENT)
Dept: SPORTS MEDICINE | Facility: OTHER | Age: 85
End: 2020-12-26

## 2020-12-29 ENCOUNTER — TELEPHONE (OUTPATIENT)
Dept: OTHER | Facility: OTHER | Age: 85
End: 2020-12-29

## 2021-01-08 ENCOUNTER — APPOINTMENT (EMERGENCY)
Dept: RADIOLOGY | Facility: HOSPITAL | Age: 86
End: 2021-01-08
Payer: MEDICARE

## 2021-01-08 ENCOUNTER — TELEPHONE (OUTPATIENT)
Dept: OTHER | Facility: OTHER | Age: 86
End: 2021-01-08

## 2021-01-08 ENCOUNTER — TELEPHONE (OUTPATIENT)
Dept: NEUROSURGERY | Facility: CLINIC | Age: 86
End: 2021-01-08

## 2021-01-08 ENCOUNTER — HOSPITAL ENCOUNTER (OUTPATIENT)
Facility: HOSPITAL | Age: 86
Setting detail: OBSERVATION
Discharge: HOSPICE | End: 2021-01-08
Attending: EMERGENCY MEDICINE | Admitting: EMERGENCY MEDICINE
Payer: MEDICARE

## 2021-01-08 VITALS
SYSTOLIC BLOOD PRESSURE: 139 MMHG | DIASTOLIC BLOOD PRESSURE: 74 MMHG | WEIGHT: 105 LBS | BODY MASS INDEX: 18.02 KG/M2 | RESPIRATION RATE: 16 BRPM | TEMPERATURE: 97.8 F | OXYGEN SATURATION: 96 % | HEART RATE: 68 BPM

## 2021-01-08 DIAGNOSIS — R29.6 FALLS FREQUENTLY: ICD-10-CM

## 2021-01-08 DIAGNOSIS — S06.5X9A SDH (SUBDURAL HEMATOMA) (HCC): Primary | ICD-10-CM

## 2021-01-08 PROBLEM — F02.80 ALZHEIMER'S DISEASE (HCC): Status: ACTIVE | Noted: 2019-07-29

## 2021-01-08 PROBLEM — S01.81XA FACIAL LACERATION: Status: ACTIVE | Noted: 2021-01-08

## 2021-01-08 PROBLEM — G30.9 ALZHEIMER'S DISEASE (HCC): Status: ACTIVE | Noted: 2019-07-29

## 2021-01-08 PROCEDURE — 99285 EMERGENCY DEPT VISIT HI MDM: CPT | Performed by: EMERGENCY MEDICINE

## 2021-01-08 PROCEDURE — 71045 X-RAY EXAM CHEST 1 VIEW: CPT

## 2021-01-08 PROCEDURE — 99235 HOSP IP/OBS SAME DATE MOD 70: CPT | Performed by: SURGERY

## 2021-01-08 PROCEDURE — NC001 PR NO CHARGE: Performed by: NURSE PRACTITIONER

## 2021-01-08 PROCEDURE — G1004 CDSM NDSC: HCPCS

## 2021-01-08 PROCEDURE — 99204 OFFICE O/P NEW MOD 45 MIN: CPT | Performed by: PHYSICIAN ASSISTANT

## 2021-01-08 PROCEDURE — 96372 THER/PROPH/DIAG INJ SC/IM: CPT

## 2021-01-08 PROCEDURE — 99215 OFFICE O/P EST HI 40 MIN: CPT | Performed by: INTERNAL MEDICINE

## 2021-01-08 PROCEDURE — 72125 CT NECK SPINE W/O DYE: CPT

## 2021-01-08 PROCEDURE — 99284 EMERGENCY DEPT VISIT MOD MDM: CPT

## 2021-01-08 PROCEDURE — 92610 EVALUATE SWALLOWING FUNCTION: CPT

## 2021-01-08 PROCEDURE — 70450 CT HEAD/BRAIN W/O DYE: CPT

## 2021-01-08 PROCEDURE — 72170 X-RAY EXAM OF PELVIS: CPT

## 2021-01-08 PROCEDURE — 70486 CT MAXILLOFACIAL W/O DYE: CPT

## 2021-01-08 RX ORDER — SODIUM CHLORIDE, SODIUM GLUCONATE, SODIUM ACETATE, POTASSIUM CHLORIDE, MAGNESIUM CHLORIDE, SODIUM PHOSPHATE, DIBASIC, AND POTASSIUM PHOSPHATE .53; .5; .37; .037; .03; .012; .00082 G/100ML; G/100ML; G/100ML; G/100ML; G/100ML; G/100ML; G/100ML
75 INJECTION, SOLUTION INTRAVENOUS CONTINUOUS
Status: DISCONTINUED | OUTPATIENT
Start: 2021-01-08 | End: 2021-01-08 | Stop reason: HOSPADM

## 2021-01-08 RX ORDER — OLANZAPINE 10 MG/1
2.5 INJECTION, POWDER, LYOPHILIZED, FOR SOLUTION INTRAMUSCULAR ONCE
Status: COMPLETED | OUTPATIENT
Start: 2021-01-08 | End: 2021-01-08

## 2021-01-08 RX ORDER — SENNOSIDES 8.6 MG
2 TABLET ORAL
Status: DISCONTINUED | OUTPATIENT
Start: 2021-01-08 | End: 2021-01-08 | Stop reason: HOSPADM

## 2021-01-08 RX ORDER — DOCUSATE SODIUM 100 MG/1
100 CAPSULE, LIQUID FILLED ORAL 2 TIMES DAILY
Status: DISCONTINUED | OUTPATIENT
Start: 2021-01-08 | End: 2021-01-08 | Stop reason: HOSPADM

## 2021-01-08 RX ORDER — AMLODIPINE BESYLATE 5 MG/1
5 TABLET ORAL DAILY
Status: DISCONTINUED | OUTPATIENT
Start: 2021-01-08 | End: 2021-01-08 | Stop reason: HOSPADM

## 2021-01-08 RX ORDER — LISINOPRIL 20 MG/1
20 TABLET ORAL DAILY
Status: DISCONTINUED | OUTPATIENT
Start: 2021-01-08 | End: 2021-01-08 | Stop reason: HOSPADM

## 2021-01-08 RX ORDER — LORAZEPAM 0.5 MG/1
0.5 TABLET ORAL
Status: DISCONTINUED | OUTPATIENT
Start: 2021-01-08 | End: 2021-01-08 | Stop reason: HOSPADM

## 2021-01-08 RX ORDER — DULOXETIN HYDROCHLORIDE 20 MG/1
20 CAPSULE, DELAYED RELEASE ORAL DAILY
Status: DISCONTINUED | OUTPATIENT
Start: 2021-01-08 | End: 2021-01-08

## 2021-01-08 RX ORDER — LORAZEPAM 2 MG/ML
0.5 CONCENTRATE ORAL
Qty: 2.5 ML | Refills: 0 | Status: SHIPPED | OUTPATIENT
Start: 2021-01-08 | End: 2021-01-08 | Stop reason: SDUPTHER

## 2021-01-08 RX ORDER — ACETAMINOPHEN 325 MG/1
650 TABLET ORAL EVERY 6 HOURS PRN
Status: DISCONTINUED | OUTPATIENT
Start: 2021-01-08 | End: 2021-01-08 | Stop reason: HOSPADM

## 2021-01-08 RX ORDER — MEMANTINE HYDROCHLORIDE 10 MG/1
10 TABLET ORAL 2 TIMES DAILY
Status: DISCONTINUED | OUTPATIENT
Start: 2021-01-08 | End: 2021-01-08

## 2021-01-08 RX ORDER — MAGNESIUM CARB/ALUMINUM HYDROX 105-160MG
120 TABLET,CHEWABLE ORAL ONCE
Status: DISCONTINUED | OUTPATIENT
Start: 2021-01-08 | End: 2021-01-08 | Stop reason: HOSPADM

## 2021-01-08 RX ORDER — LORAZEPAM 2 MG/ML
0.5 CONCENTRATE ORAL
Qty: 2.5 ML | Refills: 0 | Status: SHIPPED | OUTPATIENT
Start: 2021-01-08 | End: 2021-01-18

## 2021-01-08 RX ORDER — LANOLIN ALCOHOL/MO/W.PET/CERES
6 CREAM (GRAM) TOPICAL
Status: DISCONTINUED | OUTPATIENT
Start: 2021-01-08 | End: 2021-01-08 | Stop reason: HOSPADM

## 2021-01-08 RX ADMIN — OLANZAPINE 2.5 MG: 10 INJECTION, POWDER, FOR SOLUTION INTRAMUSCULAR at 07:56

## 2021-01-08 NOTE — ASSESSMENT & PLAN NOTE
Cleaned and taken care of by ER staff      Steri strips in place  No active bleeding  Facial CT negative

## 2021-01-08 NOTE — ED ATTENDING ATTESTATION
1/8/2021  Mandie Centeno DO, saw and evaluated the patient  I have discussed the patient with the resident/non-physician practitioner and agree with the resident's/non-physician practitioner's findings, Plan of Care, and MDM as documented in the resident's/non-physician practitioner's note, except where noted  All available labs and Radiology studies were reviewed  I was present for key portions of any procedure(s) performed by the resident/non-physician practitioner and I was immediately available to provide assistance  At this point I agree with the current assessment done in the Emergency Department  I have conducted an independent evaluation of this patient a history and physical is as follows:      80 yof found on floor at SNF  H/o dementia  Believed to have fallen but unwitnessed  On ASA  Trauma level C    Past Medical History:   Diagnosis Date    Comfort measures only status 9/22/2020    Dementia (HCC)     Hyperlipidemia     Hypertension      /73   Pulse 76   Temp 97 8 °F (36 6 °C)   Resp 18   Wt 47 6 kg (105 lb)   SpO2 98%   BMI 18 02 kg/m²   Alert, disoriented (?at baseline), c-spineNT, CTA, RRR, abd soft/NT, Lac to left forehead  CT head, face, c-spine, CXR, pelvis XR         ED Course         Critical Care Time  Procedures

## 2021-01-08 NOTE — ASSESSMENT & PLAN NOTE
Hospice patient from [facility]  HOSP  AND [place] [facility]  Frequent falls  Home meds ordered  Redirectable for short period of time

## 2021-01-08 NOTE — PROGRESS NOTES
Progress Note - Peg Villanueva 4/20/1928, 80 y o  female MRN: [id number]    Unit/Bed#: ED 27 Encounter: [id number]    Primary Care Provider: Franco Singh MD   Date and time admitted to hospital: 1/8/2021  4:19 AM        Facial laceration  Assessment & Plan  Cleaned and taken care of by ER staff  Steri strips in place  No active bleeding  Facial CT negative       Comfort measures only status  Assessment & Plan  Call to [facility] regarding patient status on Hospice  Referred to [facility] to notify them patient sent to ER    Alzheimer's disease [facility])  [facility] [address] patient from [facility]  AND [place] [facility]  Frequent falls  Home meds ordered  Redirectable for short period of time    * SDH (subdural hematoma) ([facility] [address] )  Assessment & Plan  Consult Neurosurgery  HOT PROtocol  1:1 for patient safety and dementia  Frequent falls  Q1 hour Neurochecks        TERTIARY TRAUMA SURVEY NOTE    Prophylaxis: Sequential compression device (Venodyne)     Disposition: REturn to facility and possibly back on Hospice    Code status:  Level 3 - DNAR and DNI    Consultants: Neuosurgery, Geriatrics    Is the patient 72 years or older?: YES:    1  Before the illness or injury that brought you to the Emergency, did you need someone to help you on a regular basis? 1=Yes   2  Since the illness or injury that brought you to the Emergency, have you needed more help than usual to take care of yourself? 1=Yes   3  Have you been hospitalized for one or more nights during the past 6 months (excluding a stay in the Emergency Department)? 0=No   4  In general, do you see well? 0=Yes   5  In general, do you have serious problems with your memory? 1=Yes   6  Do you take more than three different medications everyday?  1=Yes   TOTAL   5     Did you order a geriatric consult if the score was 2 or greater?: yes    Identification of Seniors at Risk      Most Recent Value   (ISAR) Identification of Seniors at Risk   Before the illness or injury that brought you to the Emergency, did you need someone to help you on a regular basis? 0 Filed at: 01/08/2021 0432   In the last 24 hours, have you needed more help than usual?  1 Filed at: 01/08/2021 0413   Have you been hospitalized for one or more nights during the past 6 months? 1 Filed at: 01/08/2021 0432   In general, do you see well? 1 Filed at: 01/08/2021 0432   In general, do you have serious problems with your memory? 1 Filed at: 01/08/2021 0793   Do you take more than three different medications every day? 1 Filed at: 01/08/2021 0432   ISAR Score  5 Filed at: 01/08/2021 3243            SUBJECTIVE:     Transfer from: [facility]  AND [place] [facility]  Outside [facility] Received: no  Tertiary Exam Due on: 1/8/21    Mechanism of Injury:Fall    Details related to Injury: +LOC:  unknown    Chief Complaint: one    HPI/Last 24 hour events: Seen and admitted to trauma with a consult to Neurosurgery and Geriatrics  Phone call to daughter Maddie Mendez regarding her mothers fall and overall status  Daughter confirmed she wanted patient Level 3 code and when clear to return to facility on Hospice care        Active medications:           Current Facility-Administered Medications:     acetaminophen (TYLENOL) tablet 650 mg, 650 mg, Oral, Q6H PRN    docusate sodium (COLACE) capsule 100 mg, 100 mg, Oral, BID    DULoxetine (CYMBALTA) delayed release capsule 20 mg, 20 mg, Oral, Daily    memantine (NAMENDA) tablet 10 mg, 10 mg, Oral, BID    senna (SENOKOT) tablet 17 2 mg, 2 tablet, Oral, HS    Current Outpatient Medications:     acetaminophen (TYLENOL) 325 mg tablet    amLODIPine-benazepril (LOTREL 5-20) 5-20 MG per capsule    aspirin 81 mg chewable tablet    docusate sodium (COLACE) 100 mg capsule    DULoxetine (CYMBALTA) 20 mg capsule    LORazepam (ATIVAN) 2 mg/mL concentrated solution    magnesium citrate (CITROMA) 1 745 g/30 mL oral solution    magnesium hydroxide (MILK OF MAGNESIA) 400 mg/5 mL oral suspension    Melatonin 5 MG CAPS    memantine (NAMENDA) 10 mg tablet    morphine 20 MG/5ML solution    traMADol (ULTRAM) 50 mg tablet    trolamine salicylate (Aspercreme/Aloe) 10 % cream      OBJECTIVE:     Vitals:   Vitals:    01/08/21 1100   BP: 127/74   Pulse: 76   Resp:    Temp:    SpO2:        Physical Exam:   GENERAL APPEARANCE: frail  NEURO: moans and opens eyes  HEENT: EOM's  somewhat able to be done  CV: RRR, no complaint of chest pain  LUNGS: CTA bilaterally, no shortness of breath  GI: no diet at this time yet  : incontinet  MSK: moves extremities  SKIN: warm and dry    I/O:   I/O     None          Invasive Devices: Invasive Devices     None                   Imaging:   Xr Chest 1 View Portable    Result Date: 1/8/2021  Impression: Persistent prominent right paratracheal opacity, likely due to aortic tortuosity  If further investigation is desired, nonemergent chest CT could be considered No acute cardiopulmonary disease  Stable appearance dating back through 6/14/2019 Workstation performed: [id number]     Ct Head Without Contrast    Result Date: 1/8/2021  Impression: Small acute subdural hematoma, 2 mm in maximal thickness, along the right side of the tentorium and right side of the posteroinferior falx  No mass effect  I personally discussed this study with Dr Maria Teresa Baker on 1/8/2021 at 8:49 AM  Workstation performed: [id number]     Trauma - Ct Head Wo Contrast    Result Date: 1/8/2021  Impression: 1  Severely limited examination due to patient motion artifact  2   Cerebral atrophy with chronic small vessel ischemic white matter disease  3   Vague, layering increased density along the tentorium on the right, extending into the posterior interhemispheric fissure  Although the findings may be artifactual and related to patient motion artifact, right-sided subdural hemorrhage not excluded  If there is continued concern for acute intracranial injury, repeat examination with sedation is highly recommended     I personally discussed this study with Dr Braxton Keane on 1/8/2021 at 5:52 AM    Workstation performed: [id number]     Trauma - Ct Facial Bones Wo Contrast    Result Date: 1/8/2021  Impression: 1  Severely limited examination due to patient motion artifact as well as beam hardening artifact from dental hardware  2   No displaced facial bone fractures 3  Periapical lucencies surrounding the upper right central and lateral incisors as well as the posterior most right upper teeth  There is a bridge connecting the upper right teeth  There is breach of the bony cortex surrounding the roots, consistent with microfracture of the maxilla  Recommend follow-up OMS consultation and direct visualization  4   Periapical lucency surrounding the root of the lower left canine  No breech of the bony cortex  I personally discussed this study with Dr Braxton Keane on 1/8/2021 at 5:52 AM  Workstation performed: [id number]     Trauma - Ct Spine Cervical Wo Contrast    Result Date: 1/8/2021  Impression: 1  Suboptimal examination due to patient motion artifact  2  Stable degenerative changes of the cervical spine  No acute cervical spine fracture or traumatic malalignment  I personally discussed this study with Dr Braxton Keane on 1/8/2021 at 5:52 AM  Workstation performed: [id number]     Xr Pelvis Ap Only 1 Or 2 Views    Result Date: 1/8/2021  Impression: No acute osseous abnormality   Workstation performed: [id number]       Labs: none

## 2021-01-08 NOTE — DISCHARGE INSTR - DIET
Recommended Diet: puree/level 1 diet and thin liquids   Recommended Form of Meds: crushed with puree   Aspiration precautions and swallowing strategies: upright posture, only feed when fully alert, slow rate of feeding and small bites/sips

## 2021-01-08 NOTE — DISCHARGE SUMMARY
Discharge Summary - Cindy Akins 80 y o  female MRN: [id number]    Unit/Bed#: ED 27 Encounter: [id number]    Admission Date:   Admission Orders (From admission, onward)     Ordered        01/08/21 1214  Place in Observation  Once                     Admitting Diagnosis: Head injury [S09 90XA]    HPI: per resident : ZENOBIA Coreasyer:  Cindy Akins is a 80 y o  female on hospice for dementia presents for unwitnessed fall in locked dementia unit, staff heard thud and found on ground in bathroom with left forehead laceration, noted to have loose right upper partial dental implant, unknown LOS, at baseline mental status of oriented only to self, pleasantly demented, limited speech but does not appear to be in any pain "    Procedures Performed: No orders of the defined types were placed in this encounter  Summary of Hospital Course: 80year old female from [facility] on Hospice sent to ER after a fall  Found to have a SDH, call placed to hospice nurse and daughter who questioned why she was sent to the ER when on Hospice  Repeat HCT demonstrated a SDH, 2mm, PAtient does respond and moan, ope her eyes, close to baseline  Daughter was willing to have her admitted and seen by Neurosurgery  Neurosurgery evaluated and no interventions, Daughter able to come see her while in ER  PAtient sent back to [facility] on Hospice per daughters wishes  Stable at baseline on time of discharge,  Please refer to medical records for further intervention  Significant Findings, Care, Treatment and Services Provided: Xr Chest 1 View Portable    Result Date: 1/8/2021  Impression: Persistent prominent right paratracheal opacity, likely due to aortic tortuosity  If further investigation is desired, nonemergent chest CT could be considered No acute cardiopulmonary disease   Stable appearance dating back through 6/14/2019 Workstation performed: [id number]     Ct Head Without Contrast    Result Date: 1/8/2021  Impression: Small acute subdural hematoma, 2 mm in maximal thickness, along the right side of the tentorium and right side of the posteroinferior falx  No mass effect  I personally discussed this study with Dr Damian Hyman on 1/8/2021 at 8:49 AM  Workstation performed: [id number]     Trauma - Ct Head Wo Contrast    Result Date: 1/8/2021  Impression: 1  Severely limited examination due to patient motion artifact  2   Cerebral atrophy with chronic small vessel ischemic white matter disease  3   Vague, layering increased density along the tentorium on the right, extending into the posterior interhemispheric fissure  Although the findings may be artifactual and related to patient motion artifact, right-sided subdural hemorrhage not excluded  If there is continued concern for acute intracranial injury, repeat examination with sedation is highly recommended  I personally discussed this study with Dr Reyes Kitchens on 1/8/2021 at 5:52 AM    Workstation performed: [id number]     Trauma - Ct Facial Bones Wo Contrast    Result Date: 1/8/2021  Impression: 1  Severely limited examination due to patient motion artifact as well as beam hardening artifact from dental hardware  2   No displaced facial bone fractures 3  Periapical lucencies surrounding the upper right central and lateral incisors as well as the posterior most right upper teeth  There is a bridge connecting the upper right teeth  There is breach of the bony cortex surrounding the roots, consistent with microfracture of the maxilla  Recommend follow-up OMS consultation and direct visualization  4   Periapical lucency surrounding the root of the lower left canine  No breech of the bony cortex  I personally discussed this study with Dr Reyes Kitchens on 1/8/2021 at 5:52 AM  Workstation performed: [id number]     Trauma - Ct Spine Cervical Wo Contrast    Result Date: 1/8/2021  Impression: 1  Suboptimal examination due to patient motion artifact   2  Stable degenerative changes of the cervical spine  No acute cervical spine fracture or traumatic malalignment  I personally discussed this study with Dr Chayo Strange on 1/8/2021 at 5:52 AM  Workstation performed: [id number]     Xr Pelvis Ap Only 1 Or 2 Views    Result Date: 1/8/2021  Impression: No acute osseous abnormality  Workstation performed: [id number]       Complications: none    Discharge Diagnosis: S/P Fall  Dementis  SDH    Resolved Problems  Date Reviewed: 1/8/2021    None          Condition at Discharge: fair         Discharge instructions/Information to patient and family:   See after visit summary for information provided to patient and family  Provisions for Follow-Up Care:  See after visit summary for information related to follow-up care and any pertinent home health orders  PCP: Fernanda Chirinos MD    Disposition: [facility]    Planned Readmission: No      Discharge Statement   I spent 30 minutes discharging the patient  This time was spent on the day of discharge  I had direct contact with the patient on the day of discharge  Additional documentation is required if more than 30 minutes were spent on discharge  Discharge Medications:  See after visit summary for reconciled discharge medications provided to patient and family

## 2021-01-08 NOTE — CONSULTS
Consultation - Geriatric Medicine   Richard Juarez 80 y o  female MRN: [id number]  Unit/Bed#: ED 27 Encounter: [id number]      Assessment/Plan     Ambulatory dysfunction with fall  -unwitnessed long-term care facility  -hx recurrent falls due to impulsivity, poor safety awareness, deconditioning and debility  -does not use assist device for ambulation at baseline due to advanced dementia and has been unable to participate/retain instruction for use per SNF staff  -requires continuous direct 1:1 at SNF long term however was d/c in Dec due to staffing, will likely require same on hospital admission due to extremely high risk of falls  -encourage use of call bell/bed alarms, keep close to nursing station and recommend 1:1 for safety  -consider PT/OT    Acute subdural hematoma  -s/p unwitnessed fall at long term care facility  -confirmed on [facility] on admission   -maintained on ASA as o/p, consider reversal with DDAVP  -recommend holding AC/AP until ensure stability of SDH  -continue Neurochecks per protocol  -management per primary team     Facial laceration  -s/p fall PTA  -continue local wound care as managed by Trauma    Acute pain due to trauma  -consider pain control per Geriatric pain protocol:  Tylenol 975mg Q8H scheduled  Roxicodone 2 5mg Q4H PRN moderate pain  Roxicodone 5mg Q4H PRN severe pain  Dilaudid 0 2mg Q4H PRN  -if unable to take pills due to dysphagia consider Roxinol vs transition to IV/SQ agents   -consider adjuncts such as Gabapentin 100mg HS and lidocaine patch topically as had been using Aspercreme to lower back daily for discomfort and patch may provide more lasting benefit  -consider addition of non-pharmacologic pain treatment including ice and frequent repositioning  -continue bowel regimen to prevent and treat constipation due to increased risk with acute pain and opiate pain medications    Alzheimer's dementia  -severe and progressive, was enrolled with [facility] prior to admission, hospice nurse spoke with family and anticipates re-enrollment with their services following hospitalization   -pleasantly confused at baseline, speech incomprehensible, dependent for most cares  -continue supportive cares, maintained on Seroquel and Lorazepam as managed by [facility], recommend continuation for symptom management     Dysphagia as late effect of CVA   -maintained on dysphagia diet with thickened liquids at baseline  -consider speech evaluation prior to starting diet as given somnolence and inability to follow commands at time of evaluation is at very high aspiration risk, if unable to return to prior consistency consider discussion with family regarding pleasure feeds  -continue aspiration precautions     Impaired Vision  -requires use of corrective lenses at baseline however broken in fall PTA per SNF and were not sent with pt to hospital  -encourage family to drop off spare pair if available   -encourage adequate lighting and encourage use of assistance with cares  -keep personal belongings close to person to avoid reaching    Impaired mastication  -requires use of upper dentures (located on bedside table in specimen cup), encourage use at all appropriate times  -assist pt with putting dentures in and out daily as well as dental hygiene     Deconditioning/debility/frailty  -palliative performance score 30  -multifactorial including age, late stage dementia, prior CVA with residual deficits including dysphagia superimposed on multiple additional chronic  co-morbidities   -continue good control chronic medical conditions  -continue to discuss treatments and supports as well as risk/benefit to ensure that they are in line with patient and families wishes, per hospice nurse anticipate will sign back on with hospice following hospital dc    Delirium precautions  -Patient is high risk of delirium due to age advanced dementia, fall with medic injury, acute pain, hospitalization  -Initiate delirium precautions  -maintain normal sleep/wake cycle  -minimize overnight interruptions, group overnight vitals/labs/nursing checks as possible  -dim lights, close blinds and turn off tv to minimize stimulation and encourage sleep environment in evenings  -ensure that pain is well controlled   -monitor for fecal and urinary retention which may precipitate delirium  -encourage family and friends at the bedside to help help calm patient if anxious  -assist with feeds if approprite    Home medication review    Personally confirmed with Marcos Benson (RN) [facility] hospice who is managing patents medications while on hospice at [facility]:    Aspercreme topically BID to lower back as needed for muscle spasm or pain  Aspirin 81 mg daily  Lorazepam    -0 25ml liquid (0 5mg) BID scheduled    AND   -0 25ml liquid (0 5mg) Q8H PRN anxiety   Colace 100 mg BID  Lotrel 5-20mg daily  Magnesium citrate daily as needed constipation  Melatonin 5 mg HS  Milk of Mag 30 mL Q72H PRN constipation   Seroquel 25 mg BID  Tylenol 650 mg Q6H PRN fever     Care coordination: rounded with Vangie (Trauma provider)     History of Present Illness   Physician Requesting Consult: Ab Stable, DO  Reason for Consult / Principal Problem: Dementia   Hx and PE limited by: N/A  Additional history obtained from: Marcos Benson [facility] nursing staff at [facility]    HPI: Cindy Akins is a 80y o  year old female advanced dementia, ambulatory dysfunction, deconditioning debility, recurrent falls dysphagia secondary to prior CVA, hyperlipidemia, overactive bladder, vitamin-D deficiency, insomnia, and depression who is admitted to the trauma service after being found down following unwitnessed fall at her long-term care facility  She is being seen in consultation by Geriatrics for advanced dementia with high risk of developing delirium during hospitalization    She seen examined at the bedside emergency department where he is lying resting, she had received an IM dose of Zyprexa shortly prior to my evaluation as she was restless and impulsive attempting to get out of bed, although she is slightly more subdued she is not somnolent and awakens to voice  Her speech is garbled and nonsensical which is baseline per her long-term care facility  She is currently enrolled with [facility] for late stage dementia and dysphagia following CVA, family requested for her to be transported to the hospital for evaluation following her fall due to having numerous falls since she was taken off direct 1:1 observation no December 11 due to staffing issues, prior to this she was on 1:1 around the clock for nearly a year  At baseline she is pleasantly confused, impulsive and continually attempts to get out of bed but is not agitated or aggressive  She wears glasses which facility staff reports were broken in the fall, she wears upper dentures which are in a specimen cup at her bedside in the ED, she does not wear hearing aids  Her speech is garbled and non-sensical at baseline  Inpatient consult to Gerontology for [address] performed by: Juancho Chatman DO  Consult ordered by:  DEJUAN Luna        Review of Systems   Unable to perform ROS: Dementia     Historical Information   Past Medical History:   Diagnosis Date    Comfort measures only status 9/22/2020    Dementia ([facility] [address] )     Hyperlipidemia     Hypertension      Past Surgical History:   Procedure Laterality Date    HYSTERECTOMY       Social History   Social History     Substance and Sexual Activity   Alcohol Use Never    Frequency: Never     Social History     Substance and Sexual Activity   Drug Use Never     Social History     Tobacco Use   Smoking Status Never Smoker   Smokeless Tobacco Never Used     Family History:   Family History   Problem Relation Age of Onset    Breast cancer Daughter      Meds/Allergies   all current active meds have been reviewed    No Known Allergies    Objective   No intake or output data in the 24 hours ending 01/08/21 1255  Invasive Devices     None               Physical Exam  Vitals signs and nursing note reviewed  Constitutional:       Appearance: She is cachectic  Comments: Thin, frail, cachectic appearing   HENT:      Head: Normocephalic  Comments: Left facial ecchymosis laceration covered with Steri-Strips  Eyes appear sunken in appearance     Nose: Nose normal       Mouth/Throat:      Mouth: Mucous membranes are dry  Comments: Dentition is poor, upper dentures are not in place (located in specimen container at bedside)  Eyes:      General: No scleral icterus  Right eye: No discharge  Left eye: No discharge  Conjunctiva/sclera: Conjunctivae normal    Neck:      Comments: Thin, trachea midline, no JVD   Cardiovascular:      Rate and Rhythm: Normal rate  Pulmonary:      Effort: No respiratory distress  Breath sounds: No wheezing  Abdominal:      Palpations: Abdomen is soft  Tenderness: There is no abdominal tenderness  Comments: Thin, non-tender to palpation   Musculoskeletal:      Comments: Severe diffuse subcutaneous fat muscle wasting is appreciated  Moves all 4 extremities spontaneously   Skin:     General: Skin is warm and dry  Comments: Skin is thin and friable  Left facial laceration with steri strips in place   Neurological:      Mental Status: She is easily aroused        Comments: Somnolent, awakens to voice, does not follow commands, speech garbled and incoherent which is baseline per SNF   Psychiatric:      Comments: Impulsive       Lab Results:   I have personally reviewed pertinent lab results including the following:    COVID-19: negative 1/5/21 at [facility] 11/9/20:  -hemoglobin 13 1, 0 9, RBC 0 7, platelets 137  -sodium 140, potassium 4 3, chloride 106, CO2 30, BUN 11, creatinine 0 81    I have personally reviewed the following imaging study reports in PACS:    [facility] w/o contrast 1/8/21:  Small amount subdural hematoma 2mm in maximal thickness along right side tentorium and right side posterior inferior falx with no mass effect  Portable chest x-ray 01/08/2021:  Persistent predominant right paratracheal opacity with the due to aortic tortuosity, no acute cardiopulmonary disease  XR pelvis 01/08/2021:  No acute osseous abnormality  CT facial bones without contrast 01/08/2021:  Severely limited exam due to patient motion artifact as well as the hardening artifact and the hardware, noticed the facial bone fractures  CT brain without contrast 01/08/2021:  Stable degenerative changes of cervical spine with no acute cervical spine fracture or traumatic malalignment    Therapies:   PT: Pending  OT: Pending     VTE Prophylaxis: Reason for no pharmacologic prophylaxis acute SDH    Code Status: Level 3 - DNAR and DNI  Advance Directive and Living Will:      Power of :    POLST:      Family and Social Support:   Freedom of Choice: Yes  CM Handoff Comments: 1/8/21- from [facility]   Referral placed  fall  sdh  d/c tbd  transport tbd    Goals of Care: Patient cannot state due to late stage dementia

## 2021-01-08 NOTE — ASSESSMENT & PLAN NOTE
Right tentorial SDH s/p unwitnessed fall at nursing home  · Reportedly on baby ASA, unknown last dose  · Patient with dementia, resides in a locked dementia unit  · Oriented to self only, intermittently follows commands  NUGETN, no focal weakness appreciated    Imaging:  · CT head w/o, 1/8/21: Vague, layering increased density along the tentorium on the right, extending into the posterior interhemispheric fissure  Although the findings may be artifactual and related to patient motion artifact, right-sided subdural hemorrhage not excluded  · CT head w/o, 1/8/21 (repeat): Small acute subdural hematoma, 2 mm in maximal thickness, along the right side of the tentorium and right side of the posteroinferior falx  No mass effect  Plan:  · Exam GCS 14 (-1 confusion) and neuro intact  · Continue frequent neurological checks  · STAT CT head with any neurological decline including drop GCS of 2pts within 1 hr   · Recommend SBP <160mmHg  · Recommend deferring keppra due to patient age  · Hold all antiplatelet and anticoagulation medications  · Pain control per primary team  · Mobilize with PT/OT  · DVT PPX: SCDs  OK for pharm ppx due to stable repeat CTH  · No neurosurgical intervention indicated at this juncture  Neurosurgery will sign off at this time  Consider not restarting ASA upon hospital discharge due to high fall risk  Recommend repeat CTH in 2 weeks; appointment will be scheduled by our office  Please call with questions or concerns

## 2021-01-08 NOTE — SPEECH THERAPY NOTE
Speech-Language Pathology Bedside Swallow Evaluation      Patient Name: Conchita Pritchard    KGHYD'A Date: 1/8/2021     Problem List  Principal Problem:    SDH (subdural hematoma) (HCC)  Active Problems:    [facility]zheimer's disease ([facility] [address] )    Facial laceration      Past Medical History  Past Medical History:   Diagnosis Date    Comfort measures only status 9/22/2020    Dementia ([facility] [address] [facility]     Hyperlipidemia     Hypertension        Past Surgical History  Past Surgical History:   Procedure Laterality Date    HYSTERECTOMY         Summary   Pt presented with functional appearing oral and pharyngeal stage swallowing skills with puree (applesauce) and individual sips of thick and thin liquid  +Episodic cough with successive sips of thin liquid  Risk/s for Aspiration: present given dementia, debilitation and if MS/LOC declines    Recommended Diet: puree/level 1 diet and thin liquids   Recommended Form of Meds: crushed with puree   Aspiration precautions and swallowing strategies: upright posture, only feed when fully alert, slow rate of feeding and small bites/sips          Current Medical Status  Conchita Medicine is a 79 yo F with h/o dementia (on hospice) who was admitted this am (1/8) s/p unwitnessed fall in locked dementia unit (staff heard thud and found on ground in bathroom with left forehead laceration, noted to have loose right upper partial dental implant, unknown LOS, at baseline mental status of oriented only to self, pleasantly demented, limited speech)  SLP Swallowing Evaluation ordered at this time  Dtr present for evaluation  Current Precautions:  Fall      Allergies:  No known food allergies    Past medical history:  Please see H&P for details    Special Studies of 1/8:  CT of head: Small acute subdural hematoma, 2 mm in maximal thickness, along the right side of the tentorium and right side of the posteroinferior falx    No mass effect  CXR: Persistent prominent right paratracheal opacity, likely due to aortic tortuosity  If further investigation is desired, nonemergent chest CT could be considered  No acute cardiopulmonary disease  CT of facial bone:  1  Severely limited examination due to patient motion artifact as well as beam hardening artifact from dental hardware  2   No displaced facial bone fractures  3  Periapical lucencies surrounding the upper right central and lateral incisors as well as the posterior most right upper teeth  There is a bridge connecting the upper right teeth  There is breach of the bony cortex surrounding the roots, consistent   with microfracture of the maxilla  Recommend follow-up OMS consultation and direct visualization  4   Periapical lucency surrounding the root of the lower left canine  No breech of the bony cortex  CT of cervical spine:   1  Suboptimal examination due to patient motion artifact  2  Stable degenerative changes of the cervical spine  No acute cervical spine fracture or traumatic malalignment        Social/Education/Vocational Hx:  Pt resides in locked dementia unit (S[facility])    Swallow Information   Current Risks for Dysphagia & Aspiration: h/o dementia, oral microfracture and discomfort/pain  Current Diet: puree/level 1 diet and thin liquids   Baseline Diet: "many pureed foods" per dtr      Baseline Assessment   Behavior/Cognition: lethargic  Speech/Language Status: able to follow commands inconsistently and limited verbal output (no dysarthria)  Patient Positioning: upright in bed  Pain Status/Interventions/Response to Interventions:  No report of or nonverbal indications of pain         Swallow Mechanism Exam  Facial: mild R droop (remote h/o Bell's palsy)  Labial: WFL  Lingual: WFL  Velum: symmetrical  Mandible: adequate ROM  Dentition: adequate with 5-6 teeth from R posterior quadrant missing (in a container in room-->came out with small fracture)  Vocal quality:clear/adequate but mild/mod reduced volume  Volitional Cough: weak   Respiratory Status: on RA c O2 sat of 97% maintained throughout the evaluation      Consistencies Assessed and Performance   Consistencies Administered: thin liquids, nectar thick, honey thick and puree    Oral Stage: Mild impaired c limited materials  Bolus formation and transfer were functional with no significant oral residue noted  No overt s/s reduced oral control c puree, thick liquid or individual sips of thin liq  Pharyngeal Stage:  Mild impaired c limited materials    Swallow Mechanics:  Swallowing initiation appeared prompt  Laryngeal rise was palpated and judged to be within functional limits    No coughing, throat clearing, change in vocal quality or respiratory status noted today c puree, thick liquid or individual sips of thin (cough x1 with successive sips (3 trials of allowing pt to drink with no regulation applied)    Esophageal Concerns: none reported    Summary and Recommendations (see above)    Results Reviewed with: patient and RN     Treatment Recommended: if pt remains hospitalized, will f/u to ensure safe intake over time    Frequency of treatment: 2-3x weekly    Short Term Goals:  -Pt will tolerate Dysphagia 1/pureed diet and thin liquid with no significant s/s oral or pharyngeal dysphagia across 1-3 diagnostic session/s

## 2021-01-08 NOTE — CASE MANAGEMENT
Pt cleared for d/c back to [facility]  Pt's daughter agreeable  Pt is an MA bedhold  Pt will return there at 65 via [id number]

## 2021-01-08 NOTE — TELEPHONE ENCOUNTER
01/26/2021-FACILITY CX APT-PT FAMILY DOES NOT WISH TO F/U BECAUSE PT IS IN HOSPICE     01/-PLEASE SEE ARI'S TELEPHONE NOTE  F/U APT ON 01/27/2021  CALLED [facility] AT PHONE#[phone], SPOKE TO GIDEON QUAN  WHO STATES PT IS ON HOSPICE BUT THEY NEED TO CHECK WITH FAMILY BEFORE CX APT       01/11/2021-[facility]    01/08/2021-PT STILL IN HOSPITAL  01/22/2021 VIRTUAL APT IN [facility] W/CT HEAD      ----- Message from Jamie Cuadra PA-C sent at 1/8/2021  3:00 PM EST -----  Regarding: hospital follow up  Please schedule 2 week hospital follow up with CT head prior  Will likely need to be virtual as she resides in a locked dementia unit and is 89!

## 2021-01-08 NOTE — QUICK NOTE
Met patients daughter at bedside, introduced self and role, had no questions for provider, offered support and contact information, dtr requests that if she is not at bedside when Neurosurgery evaluates patient that they call her with updates

## 2021-01-08 NOTE — ASSESSMENT & PLAN NOTE
Call to [facility] regarding patient status on Hospice  Referred to [facility] to notify them patient sent to ER

## 2021-01-08 NOTE — DISCHARGE INSTRUCTIONS
Neurosurgery discharge instructions following traumatic head bleed:      Do not take any blood thinning medications (ie  No Advil  No motrin  No ibuprofen  No Aleve  No Aspirin  No fishoil  No heparin  No antiplatelet / no anticoagulation medication)  Consider permanent discontinuation of aspirin   Refrain from activity that increases chance of trauma to head or falls  Recommend you take fall precaution   No strenuous activity or sports   Return to hospital Emergency Room if you experience worsening / new headache, nausea/vomiting, speech/vision change, seizure, confusion / mental status change, weakness, or other neurological changes  Follow-up as scheduled with a repeat CT head without contrast to be completed 2-3 days prior to visit  Prescription has been entered electronically  Please call  to schedule

## 2021-01-08 NOTE — ASSESSMENT & PLAN NOTE
Consult Neurosurgery  HOT PROtocol  1:1 for patient safety and dementia  Frequent falls  Q1 hour Neurochecks

## 2021-01-08 NOTE — CONSULTS
Consult- Pepe Hollins 4/20/1928, 80 y o  female MRN: [id number]    Unit/Bed#: ED 27 Encounter: [id number]    Primary Care Provider: Kaiden Hui MD   Date and time admitted to hospital: 1/8/2021  4:19 AM      Inpatient consult to Neurosurgery  Consult performed by: Rosy García PA-C  Consult ordered by: DEJUAN Rojas      Consult completed 1/8/21 at 2:45pm    * SDH (subdural hematoma) [facility])  Assessment & Plan  Right tentorial SDH s/p unwitnessed fall at nursing home  · Reportedly on baby ASA, unknown last dose  · Patient with dementia, resides in a locked dementia unit  · Oriented to self only, intermittently follows commands  NUGENT, no focal weakness appreciated    Imaging:  · CT head w/o, 1/8/21: Vague, layering increased density along the tentorium on the right, extending into the posterior interhemispheric fissure  Although the findings may be artifactual and related to patient motion artifact, right-sided subdural hemorrhage not excluded  · CT head w/o, 1/8/21 (repeat): Small acute subdural hematoma, 2 mm in maximal thickness, along the right side of the tentorium and right side of the posteroinferior falx  No mass effect  Plan:  · Exam GCS 14 (-1 confusion) and neuro intact  · Continue frequent neurological checks  · STAT CT head with any neurological decline including drop GCS of 2pts within 1 hr   · Recommend SBP <160mmHg  · Recommend deferring keppra due to patient age  · Hold all antiplatelet and anticoagulation medications  · Pain control per primary team  · Mobilize with PT/OT  · DVT PPX: SCDs  OK for pharm ppx due to stable repeat CTH  · No neurosurgical intervention indicated at this juncture  Neurosurgery will sign off at this time  Consider not restarting ASA upon hospital discharge due to high fall risk  Recommend repeat CTH in 2 weeks; appointment will be scheduled by our office  Please call with questions or concerns            History of Present Illness     HPI: Oanh Senior is a 80y o  year old female with PMH including dementia, HLD, HTN on ASA 81mg daily who presents after an unwitnessed fall this morning in the bathroom  She resides in a locked dementia unit, and her 1:1 was discontinued due to staffing issues  Her daughter is at the bedside and states she falls frequently  She currently has no new focal neurological deficits (per daughter)  She is pleasantly demented and does not follow commands  She is oriented to her self  She appears agitated  She does have a left forehead laceration  Imaging was significant for a small right tentorial SDH which was stable on repeat exam  She also appears to have a left frontoparietal mass, likely meningioma, that is seen and stable on imaging in July 2019 but not noted on any prior CT head imaging         Review of Systems   Unable to perform ROS: Dementia       Historical Information   Past Medical History:   Diagnosis Date    Comfort measures only status 9/22/2020    Dementia ([facility] [address] )     Hyperlipidemia     Hypertension      Past Surgical History:   Procedure Laterality Date    HYSTERECTOMY       Social History     Substance and Sexual Activity   Alcohol Use Never    Frequency: Never     Social History     Substance and Sexual Activity   Drug Use Never     Social History     Tobacco Use   Smoking Status Never Smoker   Smokeless Tobacco Never Used     Family History   Problem Relation Age of Onset    Breast cancer Daughter        Meds/Allergies   all current active meds have been reviewed, current meds:   Current Facility-Administered Medications   Medication Dose Route Frequency    acetaminophen (TYLENOL) tablet 650 mg  650 mg Oral Q6H PRN    docusate sodium (COLACE) capsule 100 mg  100 mg Oral BID    DULoxetine (CYMBALTA) delayed release capsule 20 mg  20 mg Oral Daily    multi-electrolyte (PLASMALYTE-A/ISOLYTE-S PH 7 4) IV solution  75 mL/hr Intravenous Continuous    senna (SENOKOT) tablet 17 2 mg  2 tablet Oral HS    and PTA meds:   Prior to Admission Medications   Prescriptions Last Dose Informant Patient Reported? Taking? DULoxetine (CYMBALTA) 20 mg capsule   Yes No   Sig: Take 20 mg by mouth daily   LORazepam (ATIVAN) 2 mg/mL concentrated solution   Yes No   Sig: Take 0 5 mg by mouth daily at bedtime And 0 125mL or 0 25mg every 4 hours as needed for anxiety   Melatonin 5 MG CAPS   Yes No   Sig: Take 5 mg by mouth daily at bedtime   acetaminophen (TYLENOL) 325 mg tablet   Yes No   Sig: Take 650 mg by mouth every 6 (six) hours as needed for mild pain Pain and elevated temperature   amLODIPine-benazepril (LOTREL 5-20) 5-20 MG per capsule   Yes No   Sig: Take 1 capsule by mouth daily   aspirin 81 mg chewable tablet   Yes No   Sig: Chew 81 mg daily   docusate sodium (COLACE) 100 mg capsule   Yes No   Sig: Take 100 mg by mouth 2 (two) times a day   magnesium citrate (CITROMA) 1 745 g/30 mL oral solution   Yes No   Sig: Take 120 mL by mouth once   magnesium hydroxide (MILK OF MAGNESIA) 400 mg/5 mL oral suspension   Yes No   Sig: Take 5 mL by mouth daily as needed for constipation   memantine (NAMENDA) 10 mg tablet   Yes No   Sig: Take 10 mg by mouth 2 (two) times a day   morphine 20 MG/5ML solution   Yes No   Sig: Take 0 25 mL by mouth every 6 (six) hours as needed for severe pain   traMADol (ULTRAM) 50 mg tablet   Yes No   Sig: Take 50 mg by mouth every 12 (twelve) hours as needed for moderate pain   trolamine salicylate (Aspercreme/Aloe) 10 % cream   Yes No   Sig: Apply topically as needed for muscle/joint pain      Facility-Administered Medications: None     No Known Allergies    Objective   I/O     None          Physical Exam  Vitals signs and nursing note reviewed  Constitutional:       Appearance: Normal appearance  She is well-developed  Comments: Frail appearing   HENT:      Head: Normocephalic        Comments: Left eyebrow laceration, repaired with steri strips  Eyes:      Extraocular Movements: Extraocular movements intact  Pupils: Pupils are equal, round, and reactive to light  Neck:      Musculoskeletal: Normal range of motion  Cardiovascular:      Rate and Rhythm: Normal rate  Pulmonary:      Effort: Pulmonary effort is normal  No respiratory distress  Abdominal:      Palpations: Abdomen is soft  Musculoskeletal: Normal range of motion  Skin:     General: Skin is warm and dry  Neurological:      Mental Status: She is alert  Mental status is at baseline  Deep Tendon Reflexes:      Reflex Scores:       Tricep reflexes are 2+ on the right side and 2+ on the left side  Bicep reflexes are 2+ on the right side and 2+ on the left side  Brachioradialis reflexes are 2+ on the right side and 2+ on the left side  Patellar reflexes are 2+ on the right side and 2+ on the left side  Achilles reflexes are 2+ on the right side and 2+ on the left side  Neurologic Exam     Mental Status   Oriented to person  Disoriented to place  Disoriented to time  Attention: decreased  Concentration: decreased  Speech: (Limited verbal communication)  Level of consciousness: alert  Knowledge: good  Normal comprehension  Agitated  Attempting to get out of bed     Cranial Nerves   Cranial nerves II through XII intact  CN III, IV, VI   Pupils are equal, round, and reactive to light      Motor Exam   Muscle bulk: decreased  Overall muscle tone: normal  Seen NUGENT independently  Does not follow commands     Sensory Exam   Responds to LT in all extremities     Gait, Coordination, and Reflexes     Tremor   Resting tremor: absent    Reflexes   Right brachioradialis: 2+  Left brachioradialis: 2+  Right biceps: 2+  Left biceps: 2+  Right triceps: 2+  Left triceps: 2+  Right patellar: 2+  Left patellar: 2+  Right achilles: 2+  Left achilles: 2+  Right : 2+  Left : 2+  Right Pavon: absent  Left Pavon: absent  Right ankle clonus: absent  Left ankle clonus: absent        Vitals:Blood pressure 125/73, pulse 68, temperature 97 8 °F (36 6 °C), resp  rate 16, weight 47 6 kg (105 lb), SpO2 96 %  ,Body mass index is 18 02 kg/m²  Lab Results:         Invalid input(s):  EOSPCT        Invalid input(s): LABALBU              No results found for: TROPONINT  ABG:No results found for: PHART, KOZ8ACX, PO2ART, OWX1JUW, U6BWFRRV, BEART, SOURCE    Imaging Studies: I have personally reviewed pertinent reports  and I have personally reviewed pertinent films in PACS     Xr Chest 1 View Portable    Result Date: 1/8/2021  Narrative: CHEST INDICATION: Chest pain COMPARISON:  8/20/2020 chest x-ray; 6/14/2019 chest x-ray EXAM PERFORMED/VIEWS:  XR CHEST PORTABLE Single portable view FINDINGS: Prominent right paratracheal opacity, likely due to aortic tortuosity  This appears similar to prior exams dating back through at least 6/14/2019  The stability precludes aggressive pathology  If further investigation is desired, nonemergent chest CT could be considered  The lungs are clear  No pneumothorax or pleural effusion  Degenerative changes both shoulders with interval progression     Impression: Persistent prominent right paratracheal opacity, likely due to aortic tortuosity  If further investigation is desired, nonemergent chest CT could be considered No acute cardiopulmonary disease  Stable appearance dating back through 6/14/2019 Workstation performed: [id number]     Ct Head Without Contrast    Result Date: 1/8/2021  Narrative: CT BRAIN - WITHOUT CONTRAST INDICATION:   Head trauma, minor (Age => 65y) f/u SDH vs artifact  Suggestion of small right-sided subdural hematoma on a CT limited by motion artifact from earlier today  Reevaluate  COMPARISON:  CT of the head from January 8, 2021 at 0509 hours  TECHNIQUE:  CT examination of the brain was performed  In addition to axial images, sagittal and coronal 2D reformatted images were created and submitted for interpretation   Radiation dose length product (DLP) for this visit:  861 75 mGy-cm   This examination, like all CT scans performed in the [facility], was performed utilizing techniques to minimize radiation dose exposure, including the use of iterative  reconstruction and automated exposure control  IMAGE QUALITY:  Diagnostic  FINDINGS: PARENCHYMA:  No intracranial mass, mass effect or midline shift  No CT signs of acute infarction  No acute parenchymal hemorrhage  Decreased white matter attenuation, typical of chronic microvascular ischemic changes  VENTRICLES AND EXTRA-AXIAL SPACES:  Normal in size for the patient's age  Acute subdural hematoma along the right side of the tentorium and right posterior falx, 2 mm in maximal thickness, with no mass effect  No other extra-axial hemorrhage or collection  VISUALIZED ORBITS AND PARANASAL SINUSES:  Unremarkable  CALVARIUM AND EXTRACRANIAL SOFT TISSUES:  Normal      Impression: Small acute subdural hematoma, 2 mm in maximal thickness, along the right side of the tentorium and right side of the posteroinferior falx  No mass effect  I personally discussed this study with Dr Maria Teresa Baker on 1/8/2021 at 8:49 AM  Workstation performed: [id number]     Trauma - Ct Head Wo Contrast    Result Date: 1/8/2021  Narrative: CT BRAIN - WITHOUT CONTRAST INDICATION:   TRAUMA  Unwitnessed fall  Found down  Laceration left eye  Loose teeth  COMPARISON:  None  TECHNIQUE:  CT examination of the brain was performed  In addition to axial images, sagittal and coronal 2D reformatted images were created and submitted for interpretation  Radiation dose length product (DLP) for this visit:  1556 26 mGy-cm   This examination, like all CT scans performed in the [facility], was performed utilizing techniques to minimize radiation dose exposure, including the use of iterative reconstruction and automated exposure control  IMAGE QUALITY:  Severely limited by patient motion artifact   FINDINGS: PARENCHYMA:  No acute intraparenchymal hemorrhages  Areas of decreased attenuation in the periventricular regions and centrum semiovale bilaterally, consistent with chronic small vessel ischemic white matter disease  Chronic lacunar infarctions are present in the basal ganglia bilaterally and central sandi  Bilateral internal carotid artery and vertebral artery calcifications  VENTRICLES AND EXTRA-AXIAL SPACES:  Ventricles and cerebral sulci are moderately dilated  There is increased density along the tentorium on the right, extending into the posterior interhemispheric fissure  Although the findings may be related to patient motion artifact, supratentorial subdural hemorrhage not excluded  VISUALIZED ORBITS AND PARANASAL SINUSES:  The globes are symmetric and intact  Bilateral lens surgery  Visualized paranasal sinuses and mastoid air cells are clear  Middle ear cavities are clear bilaterally  CALVARIUM AND EXTRACRANIAL SOFT TISSUES:  Soft tissue swelling overlying the right frontal convexity  Underlying bony calvarium intact  Impression: 1  Severely limited examination due to patient motion artifact  2   Cerebral atrophy with chronic small vessel ischemic white matter disease  3   Vague, layering increased density along the tentorium on the right, extending into the posterior interhemispheric fissure  Although the findings may be artifactual and related to patient motion artifact, right-sided subdural hemorrhage not excluded  If there is continued concern for acute intracranial injury, repeat examination with sedation is highly recommended  I personally discussed this study with Dr Tiburcio Duarte on 1/8/2021 at 5:52 AM    Workstation performed: [id number]     Trauma - Ct Facial Bones Wo Contrast    Result Date: 1/8/2021  Narrative: CT FACIAL BONES WITHOUT INTRAVENOUS CONTRAST INDICATION:   TRAUMA  Unwitnessed fall  Found down  Laceration left eye  Loose teeth  COMPARISON: None   TECHNIQUE:  Axial CT images were obtained through the facial bones with additional sagittal and coronal reconstructions  Radiation dose length product (DLP) for this visit:  430 41 mGy-cm   This examination, like all CT scans performed in the [facility], was performed utilizing techniques to minimize radiation dose exposure, including the use of iterative  reconstruction and automated exposure control  IMAGE QUALITY:  Study somewhat limited due to patient motion artifact as well as beam hardening artifact from dental hardware  FINDINGS: FACIAL BONES: Osseous structures demineralized  No lytic or blastic lesions  No facial bone fracture identified  Normal alignment of the temporomandibular joints  There is beam hardening artifact from dental hardware  There is a bridge connecting the upper right teeth, resulting in severe beam hardening artifact  There are periapical lucencies surrounding the root apices of the upper right central and lateral incisors  There is breach of the bony cortex along the buccal surface of the root of the upper right central incisor  There is periapical lucency surrounding the root apices of the upper right posterior most teeth  There is breach of the bony cortex along both the lingual and buccal surfaces of the roots  ORBITS:  Orbital globes, optic nerves, and extraocular muscles appear symmetric and normal  There is no evidence of retrobulbar mass, abscess, or hematoma  SINUSES:  Minimal mucosal thickening within the maxillary sinuses bilaterally as well as ethmoid air cells bilaterally  SOFT TISSUES:  There is soft tissue swelling with a laceration in the left supraorbital region  Underlying bony calvarium intact  Impression: 1  Severely limited examination due to patient motion artifact as well as beam hardening artifact from dental hardware  2   No displaced facial bone fractures 3    Periapical lucencies surrounding the upper right central and lateral incisors as well as the posterior most right upper teeth  There is a bridge connecting the upper right teeth  There is breach of the bony cortex surrounding the roots, consistent with microfracture of the maxilla  Recommend follow-up OMS consultation and direct visualization  4   Periapical lucency surrounding the root of the lower left canine  No breech of the bony cortex  I personally discussed this study with Dr Ana Henning on 1/8/2021 at 5:52 AM  Workstation performed: [id number]     Trauma - Ct Spine Cervical Wo Contrast    Result Date: 1/8/2021  Narrative: CT CERVICAL SPINE - WITHOUT CONTRAST INDICATION:   TRAUMA  Unwitnessed fall  Found down  Laceration left eye  Loose teeth  COMPARISON:  CT cervical spine August 20, 2020 TECHNIQUE:  CT examination of the cervical spine was performed without intravenous contrast   Contiguous axial images were obtained  Sagittal and coronal reconstructions were performed  Radiation dose length product (DLP) for this visit:  182 29 mGy-cm   This examination, like all CT scans performed in the [facility], was performed utilizing techniques to minimize radiation dose exposure, including the use of iterative  reconstruction and automated exposure control  IMAGE QUALITY:  Suboptimal examination due to patient motion  FINDINGS: ALIGNMENT:  Straightening and reversal of the cervical lordotic curvature  Degenerative grade 1 anterolisthesis C3 on C4  Degenerative grade 1 retrolisthesis C6-C7  VERTEBRAL BODIES:  Osseous structures severely demineralized  No acute fracture or osseous destructive lesion  Subchondral cystic changes are present throughout the cervical spine, most pronounced involving the odontoid process  DEGENERATIVE CHANGES:  Severe multilevel cervical degenerative changes are noted  No critical central canal stenosis  Degenerative changes at the atlantoaxial articulation with moderate pannus formation  This results in moderate narrowing at the craniocervical junction  PREVERTEBRAL AND PARASPINAL SOFT TISSUES: Heterogeneous appearance of the thyroid gland  Subcentimeter low-density nodules  Incidental discovery of one or more thyroid nodule(s) measuring less than 1 5 cm and without suspicious features is noted in this patient who is above 28years old; according to guidelines published in the February 2015 white paper on incidental thyroid nodules in the Journal of the [address] [place] [address] of Radiology Salvatore Sever), no further evaluation is recommended  THORACIC INLET:  Normal      Impression: 1  Suboptimal examination due to patient motion artifact  2  Stable degenerative changes of the cervical spine  No acute cervical spine fracture or traumatic malalignment  I personally discussed this study with Dr Wes Lombardo on 1/8/2021 at 5:52 AM  Workstation performed: [id number]     Xr Pelvis Ap Only 1 Or 2 Views    Result Date: 1/8/2021  Narrative: PELVIS INDICATION:   found down  COMPARISON:  None VIEWS:  XR PELVIS AP ONLY 1 OR 2 VW Single view FINDINGS: No acute fracture or hip dislocation  No significant degenerative changes  No lytic or blastic osseous lesion  Soft tissues are unremarkable  Multilevel degenerative lumbar spondylosis     Impression: No acute osseous abnormality  Workstation performed: [id number]     EKG, Pathology, and Other Studies: I have personally reviewed pertinent reports  VTE Prophylaxis: Sequential compression device Shante Filler)     Code Status: Level 3 - DNAR and DNI  Advance Directive and Living Will:      Power of :    POLST:      Counseling / Coordination of Care  I spent 45 minutes with the patient

## 2021-01-08 NOTE — TRANSPORTATION MEDICAL NECESSITY
Section I - General Information    Name of Patient: Donna Nunez                 : 1928    Medicare #: [id number]  Transport Date: 21 (PCS is valid for round trips on this date and for all repetitive trips in the 60-day range as noted below )  Origin: [place] [address]: [address]  Is the pt's stay covered under Medicare Part A (PPS/DRG)   []     Closest appropriate facility? If no, why is transport to more distant facility required? Yes  If hospice pt, is this transport related to pt's terminal illness? No       Section II - Medical Necessity Questionnaire  Ambulance transportation is medically necessary only if other means of transport are contraindicated or would be potentially harmful to the patient  To meet this requirement, the patient must either be "bed confined" or suffer from a condition such that transport by means other than ambulance is contraindicated by the patient's condition  The following questions must be answered by the medical professional signing below for this form to be valid:    1)  Describe the MEDICAL CONDITION (physical and/or mental) of this patient AT [address] [place] [address] that requires the patient to be transported in an ambulance and why transport by other means is contraindicated by the patient's condition: SDH, dementia, fall    2) Is the patient "bed confined" as defined below? No  To be "be confined" the patient must satisfy all three of the following conditions: (1) unable to get up from bed without Assistance; AND (2) unable to ambulate; AND (3) unable to sit in a chair or wheelchair  3) Can this patient safely be transported by car or wheelchair van (i e , seated during transport without a medical attendant or monitoring)?    No    4) In addition to completing questions 1-3 above, please check any of the following conditions that apply*: *Note: supporting documentation for any boxes checked must be maintained in the patient's medical records  If hosp-hosp transfer, describe services needed at 2nd facility not available at 1st facility? Moderate/severe pain on movement   Unable to tolerate seated position for time needed to transport   Other(specify) Hospice      Section III - Signature of Physician or Healthcare Professional  I certify that the above information is true and correct based on my evaluation of this patient, and represent that the patient requires transport by ambulance and that other forms of transport are contraindicated  I understand that this information will be used by the Centers for Medicare and Medicaid Services (CMS) to support the determination of medical necessity for ambulance services, and I represent that I have personal knowledge of the patient's condition at time of transport  []  If this box is checked, I also certify that the patient is physically or mentally incapable of signing the ambulance service's claim and that the institution with which I am affiliated has furnished care, services, or assistance to the patient  My signature below is made on behalf of the patient pursuant to 42 CFR §424 36(b)(4)  In accordance with 42 CFR §424 37, the specific reason(s) that the patient is physically or mentally incapable of signing the claim form is as follows:       Signature of Physician* or Healthcare Professional______________________________________________________________  Signature Date 01/08/21 (For scheduled repetitive transports, this form is not valid for transports performed more than 60 days after this date)    Printed Name & Credentials of Physician or Healthcare Professional (MD, DO, RN, etc )____David BARRERA____________________________  *Form must be signed by patient's attending physician for scheduled, repetitive transports   For non-repetitive, unscheduled ambulance transports, if unable to obtain the signature of the attending physician, any of the following may sign (choose appropriate option below)  [] Physician Assistant []  Clinical Nurse Specialist []  Registered Nurse  []  Nurse Practitioner  [x] Discharge Planner

## 2021-01-08 NOTE — CASE MANAGEMENT
Pt is NOT a bundle  Pt is NOT a 30 day readmission    CM spoke to pt's dtr Marcel Robert to discuss the role of CM  Pt resides at [facility]  [facility] and has been there for over 18 months  Pt has a walk-in shower, grab bars, shower bench, and raised toilet  Pt requires assistance for all ADL/IADLs as she is legally blind and very hard of hearing  Pt uses a walker and needs assistance for all mobility  Pt was active with [facility] PTA but was revoked upon being admitted  Pt has living will  Pt obtains her medications on site  Pt has no hx of mental health, substance abuse or VNA  Pt's family are amenable to the pt returning to [facility]  [facility] [place] [facility] when medically stable  CM placed referral and will follow up    CM reviewed d/c planning process including the following: identifying help at home, patient preference for d/c planning needs, Discharge Lounge, [facility] Meds to Bed program, availability of treatment team to discuss questions or concerns patient and/or family may have regarding understanding medications and recognizing signs and symptoms once discharged  CM also encouraged patient to follow up with all recommended appointments after discharge  Patient advised of importance for patient and family to participate in managing patients medical well being

## 2021-01-08 NOTE — ED PROVIDER NOTES
Emergency Department Trauma Note  Magali Ledezma 80 y o  female MRN: [id number]  Unit/Bed#: Z6HB/Z6HB Encounter: [id number]      Trauma Alert: Trauma Acuity: C  Model of Arrival:   via    Trauma Team: Current Providers  Attending Provider: Oumar Mckinney DO  Resident: Anuradha Choi DO  Resident: Shelbie Loera MD  Consulting Physician: Krystal Ontiveros DO  ED Technician: Mana Uriarte  Speech Language Pathologist: DARCIE Braga  Registered Nurse: Shahriar Leal, ALLEGRA  ED Technician: Collins Fuller  Resident: Linda Rojas DO  Registered Nurse: Neno Ro RN  Consultants: None      History of Present Illness     Chief Complaint:   Chief Complaint   Patient presents with    Fall     Per EMS, "Pt was found on the floor  Laceration to left eye  Teeth are also loose "  Pt confused at this time, laceration noted  Upper right sided teeth are loose  HPI:  Magali Ledezma is a 80 y o  female who presents after fall  Mechanism:Details of Incident: Unwitnessed fall at nursing home          49-year-old female history of dementia presents from fall  Per worker at nursing facility patient was put to bed and checked on around 1:50 a m  Staff workers then heard a thud and found patient on the ground in her bathroom  Patient had a laceration to her left forehead, she was noted to have loose right upper partial dental implant  Unknown loss of consciousness, patient does take aspirin daily  On examination patient is unable to provide history due to dementia  Patient is reportedly on hospice  Review of Systems   Unable to perform ROS: Dementia       Historical Information     Immunizations: There is no immunization history on file for this patient      Past Medical History:   Diagnosis Date    Comfort measures only status 9/22/2020    Dementia ([facility] [address] )     Hyperlipidemia     Hypertension        Family History   Problem Relation Age of Onset    Breast cancer Daughter      Past Surgical History:   Procedure Laterality Date    HYSTERECTOMY       Social History     Tobacco Use    Smoking status: Never Smoker    Smokeless tobacco: Never Used   Substance Use Topics    Alcohol use: Never     Frequency: Never    Drug use: Never     E-Cigarette/Vaping    E-Cigarette Use Never User      E-Cigarette/Vaping Substances       Family History: non-contributory    Meds/Allergies   Prior to Admission Medications   Prescriptions Last Dose Informant Patient Reported? Taking?    DULoxetine (CYMBALTA) 20 mg capsule   Yes No   Sig: Take 20 mg by mouth daily   LORazepam (ATIVAN) 2 mg/mL concentrated solution   Yes No   Sig: Take 0 5 mg by mouth daily at bedtime And 0 125mL or 0 25mg every 4 hours as needed for anxiety   LORazepam (ATIVAN) 2 mg/mL concentrated solution   No No   Sig: Take 0 25 mL (0 5 mg total) by mouth daily at bedtime for 10 days And 0 25mL or 05 mg bid and  25 ml or  5 mg q8h pre anxiety   Melatonin 5 MG CAPS   Yes No   Sig: Take 5 mg by mouth daily at bedtime   acetaminophen (TYLENOL) 325 mg tablet   Yes No   Sig: Take 650 mg by mouth every 6 (six) hours as needed for mild pain Pain and elevated temperature   amLODIPine-benazepril (LOTREL 5-20) 5-20 MG per capsule   Yes No   Sig: Take 1 capsule by mouth daily   aspirin 81 mg chewable tablet   Yes No   Sig: Chew 81 mg daily   docusate sodium (COLACE) 100 mg capsule   Yes No   Sig: Take 100 mg by mouth 2 (two) times a day   magnesium citrate (CITROMA) 1 745 g/30 mL oral solution   Yes No   Sig: Take 120 mL by mouth once   magnesium hydroxide (MILK OF MAGNESIA) 400 mg/5 mL oral suspension   Yes No   Sig: Take 5 mL by mouth daily as needed for constipation   memantine (NAMENDA) 10 mg tablet   Yes No   Sig: Take 10 mg by mouth 2 (two) times a day   morphine 20 MG/5ML solution   Yes No   Sig: Take 0 25 mL by mouth every 6 (six) hours as needed for severe pain   traMADol (ULTRAM) 50 mg tablet   Yes No   Sig: Take 50 mg by mouth every 12 (twelve) hours as needed for moderate pain   trolamine salicylate (Aspercreme/Aloe) 10 % cream   Yes No   Sig: Apply topically as needed for muscle/joint pain      Facility-Administered Medications: None       No Known Allergies    PHYSICAL EXAM    PE limited by: none    Objective   Vitals:   First set: Temperature: 97 8 °F (36 6 °C) (01/08/21 0430)  Pulse: 78 (01/08/21 0430)  Respirations: 18 (01/08/21 0430)  Blood Pressure: 162/75 (01/08/21 0430)  SpO2: 98 % (01/08/21 0430)    Primary Survey:   (A) Airway: intact  (B) Breathing: b/l breath sounds  (C) Circulation: Pulses:   pedal  2/4 and radial  2/4  (D) Disabliity:  Eye Opening:   None = 1, Motor Response: Withdraws to pain = 4 and Verbal Response:  Inappropriate words = 3  (E) Expose:  Completed    Secondary Survey: (Click on Physical Exam tab above)  Physical Exam  Constitutional:       General: She is not in acute distress  Appearance: Normal appearance  She is ill-appearing (chronically-ill appearing)  She is not toxic-appearing or diaphoretic  Comments: Cachectic   HENT:      Head: Normocephalic  Comments: V-shaped laceration, about 1cm to each side     Right Ear: External ear normal       Left Ear: External ear normal       Nose: Nose normal       Mouth/Throat:      Mouth: Mucous membranes are dry  Comments: Right upper partial dislodged  Cardiovascular:      Rate and Rhythm: Normal rate and regular rhythm  Pulses: Normal pulses  Pulmonary:      Effort: Pulmonary effort is normal  No respiratory distress  Abdominal:      General: There is no distension  Palpations: Abdomen is soft  Tenderness: There is no abdominal tenderness  Musculoskeletal:         General: No deformity or signs of injury  Comments: In c-collar, no steps to c/t//l spine   Skin:     General: Skin is warm  Coloration: Skin is not pale  Neurological:      Mental Status: Mental status is at baseline           Cervical spine cleared by clinical criteria? No (imaging required)      Invasive Devices     None                 Lab Results:   Results Reviewed     None                 Imaging Studies:   Direct to CT: No  CT head without contrast   Final Result by Stephen Michaud MD (01/08 6645)      Small acute subdural hematoma, 2 mm in maximal thickness, along the right side of the tentorium and right side of the posteroinferior falx  No mass effect  I personally discussed this study with Dr Deyanira Dennis on 1/8/2021 at 8:49 AM                   Workstation performed: [id number]         XR pelvis ap only 1 or 2 views   Final Result by Sherryle Kos, MD (01/08 4656)      No acute osseous abnormality  Workstation performed: [id number]         XR chest 1 view portable   Final Result by Sherryle Kos, MD (01/08 6508)   Persistent prominent right paratracheal opacity, likely due to aortic tortuosity  If further investigation is desired, nonemergent chest CT could be considered      No acute cardiopulmonary disease  Stable appearance dating back through 6/14/2019            Workstation performed: [id number]         TRAUMA - CT head wo contrast   Final Result by Cesario Che DO (01/08 0604)   1  Severely limited examination due to patient motion artifact  2   Cerebral atrophy with chronic small vessel ischemic white matter disease  3   Vague, layering increased density along the tentorium on the right, extending into the posterior interhemispheric fissure  Although the findings may be artifactual and related to patient motion artifact, right-sided subdural hemorrhage not excluded  If there is continued concern for acute intracranial injury, repeat examination with sedation is highly recommended                            I personally discussed this study with Dr Milan Ramsay on 1/8/2021 at 5:52 AM                           Workstation performed: [id number]         TRAUMA - CT spine cervical wo contrast Final Result by Faby Blake DO (01/08 7860)   1  Suboptimal examination due to patient motion artifact  2  Stable degenerative changes of the cervical spine  No acute cervical spine fracture or traumatic malalignment  I personally discussed this study with Dr Radha Bazan on 1/8/2021 at 5:52 AM          Workstation performed: [id number]         TRAUMA - CT facial bones wo contrast   Final Result by Faby Blake DO (01/08 5316)   1  Severely limited examination due to patient motion artifact as well as beam hardening artifact from dental hardware  2   No displaced facial bone fractures   3  Periapical lucencies surrounding the upper right central and lateral incisors as well as the posterior most right upper teeth  There is a bridge connecting the upper right teeth  There is breach of the bony cortex surrounding the roots, consistent    with microfracture of the maxilla  Recommend follow-up OMS consultation and direct visualization  4   Periapical lucency surrounding the root of the lower left canine  No breech of the bony cortex  I personally discussed this study with Dr Radha Bazan on 1/8/2021 at 5:52 AM                Workstation performed: [id number]         CT head wo contrast    (Results Pending)         Procedures  Procedures         ED Course  ED Course as of Jan 11 0716   [facility] Jan 08, 2021   0509 Patient resides at [address] in room, heard thud and found on bathroom floor; 1:50AM last toileted and saw; ON HOSPICE; teeth new finding, could "see" beforehand but not like now and immobile      0557 Rads: ?R SDH but too much movement; cpsine severe DJD; facial plate connecting teeth with beam artifact, essentially free floating partial      7212 Spoke to daughter, Nallely Martines at [phone], regarding fall and imaging findings  Daughter agrees to admission  0711 Implant fell out during scans        8227 Trauma to evaluate              MDM  Number of Diagnoses or Management Options  Falls frequently:   SDH (subdural hematoma) [facility]):   Diagnosis management comments: 27-year-old female presents, see trauma alert for fall with head strike on aspirin  Patient has history of severe dementia is a locked dementia unit as well as on hospice  Patient has in no acute distress  CT head performed as patient has obvious laceration and on daily aspirin, is also anticipatory for decision making  CT had initially read is artifact for since the subdural due to motion artifact  CT C-spine shows degenerative disease no evidence of fracture  CT face done as patient arrived with right upper dental partial lose, this was negative  Patient's daughter was updated to resolved, she would like to pursue admission  Discussion had with Trauma Service regarding patient, trauma initially coming to consult on patient  Patient was signed out to Dr Guillermo Ott prior to ultimate disposition  Disposition  [facility] Transfer: No  Final diagnoses:   SDH (subdural hematoma) ([facility] [address] )   Falls frequently     Time reflects when diagnosis was documented in both MDM as applicable and the Disposition within this note     Time User Action Codes Description Comment    1/8/2021 12:13 PM Antonieta Sanchez Add [C33 1E6M] SDH (subdural hematoma) ([facility] [address] )     1/8/2021 12:13 PM Antonieat Sanchez Add [R29 6] Falls frequently       ED Disposition     ED Disposition Condition Date/Time Comment    Discharge  Fri Jan 8, 2021  7:26 PM Neetu Ayers discharge to home/self care  Follow-up Information     Follow up With Specialties Details Why Contact Info Additional [address] Neurosurgery Follow up in 2 week(s) Follow up in our office in 2 weeks  Please obtain repeat CT head prior to appointment   [address], [address], [place]rell[place], [address] [facility] [place] Follow up call to review events of recent hospitalziation [address] [place]  [phone]           Discharge Medication List as of 1/8/2021  7:56 PM      CONTINUE these medications which have CHANGED    Details   LORazepam (ATIVAN) 2 mg/mL concentrated solution Take 0 25 mL (0 5 mg total) by mouth daily at bedtime for 10 days And 0 25mL or 05 mg bid and  25 ml or  5 mg q8h pre anxiety, Starting Fri 1/8/2021, Until Mon 1/18/2021, Print         CONTINUE these medications which have NOT CHANGED    Details   acetaminophen (TYLENOL) 325 mg tablet Take 650 mg by mouth every 6 (six) hours as needed for mild pain Pain and elevated temperature, Historical Med      amLODIPine-benazepril (LOTREL 5-20) 5-20 MG per capsule Take 1 capsule by mouth daily, Historical Med      docusate sodium (COLACE) 100 mg capsule Take 100 mg by mouth 2 (two) times a day, Historical Med      magnesium citrate (CITROMA) 1 745 g/30 mL oral solution Take 120 mL by mouth once, Historical Med      magnesium hydroxide (MILK OF MAGNESIA) 400 mg/5 mL oral suspension Take 5 mL by mouth daily as needed for constipation, Historical Med      Melatonin 5 MG CAPS Take 5 mg by mouth daily at bedtime, Historical Med      trolamine salicylate (Aspercreme/Aloe) 10 % cream Apply topically as needed for muscle/joint pain, Historical Med         STOP taking these medications       aspirin 81 mg chewable tablet Comments:   Reason for Stopping:         DULoxetine (CYMBALTA) 20 mg capsule Comments:   Reason for Stopping:         memantine (NAMENDA) 10 mg tablet Comments:   Reason for Stopping:         morphine 20 MG/5ML solution Comments:   Reason for Stopping:         traMADol (ULTRAM) 50 mg tablet Comments:   Reason for Stopping:             Outpatient Discharge Orders   CT head wo contrast   Standing Status: Future Standing Exp   Date: 01/08/25     Discharge Diet PDMP Review       Value Time User    PDMP Reviewed  Yes 1/8/2021  3:52 PM [address], [address] [facility]          ED Provider  Electronically Signed by         Eliud Buckner DO  01/11/21 8513

## 2021-01-08 NOTE — ED NOTES
Patient to be picked up at 2100 by [facility] aware of patient's return, as well as hospice house       Farnaz Strauss RN  01/08/21 4524

## 2021-01-08 NOTE — TRAUMA DOCUMENTATION
Patient continually trying to climb out at this  Patient redirected multiple times  Seizure pads applied to patient's bed at this time for safety at this time

## 2021-01-08 NOTE — H&P
H&P Exam - Trauma   Crow Matson 80 y o  female MRN: [id number]  Unit/Bed#: ED 27 Encounter: [id number]    Assessment/Plan   Trauma Alert: Other level c  Model of Arrival: Ambulance  Trauma Team: Attending To and Residents Fozia Yeh  Consultants: KATRIN    Trauma Active Problems: Fall on ASA, abnormal CT SDH vs artifact    Trauma Plan: Observation period, repeat head CT, facial skin tear closed by steri strips    Chief Complaint: Unwitnessed fall    History of Present Illness   HPI:  Crow Matson is a 80 y o  female on hospice for dementia presents for unwitnessed fall in locked dementia unit, staff heard thud and found on ground in bathroom with left forehead laceration, noted to have loose right upper partial dental implant, unknown LOS, at baseline mental status of oriented only to self, pleasantly demented, limited speech but does not appear to be in any pain  Mechanism:Fall    Review of Systems   Unable to perform ROS: Dementia       12-point, complete review of systems was reviewed and negative except as stated above  Historical Information   History is unobtainable from the patient due to dementia  Efforts to obtain history included the following sources: chart review, discussion with ED staff    Past Medical History:   Diagnosis Date    Comfort measures only status 9/22/2020    Dementia ([address] )     Hyperlipidemia     Hypertension      Past Surgical History:   Procedure Laterality Date    HYSTERECTOMY       Social History   Social History     Substance and Sexual Activity   Alcohol Use Never    Frequency: Never     Social History     Substance and Sexual Activity   Drug Use Never     Social History     Tobacco Use   Smoking Status Never Smoker   Smokeless Tobacco Never Used     E-Cigarette/Vaping    E-Cigarette Use Never User      E-Cigarette/Vaping Substances       There is no immunization history on file for this patient    Last Tetanus: 7/2019  Family History: Non-contributory  Unable to obtain/limited by N/A      Meds/Allergies   all current active meds have been reviewed    No Known Allergies      PHYSICAL EXAM    PE limited by: N/A    Objective   Vitals:   First set: Temperature: 97 8 °F (36 6 °C) (01/08/21 0430)  Pulse: 78 (01/08/21 0430)  Respirations: 18 (01/08/21 0430)  Blood Pressure: 162/75 (01/08/21 0430)    Primary Survey:   (A) Airway: Intact  (B) Breathing: Equal bilateral breath sounds  (C) Circulation: Pulses:   pedal  2/4 and radial  2/4  (D) Disabliity:  GCS Total:  13, Eye Opening:   Spontaneous = 4, Motor Response: Obeys commands = 6 and Verbal Response:  Inappropriate words = 3  (E) Expose:  Completed    Secondary Survey: (Click on Physical Exam tab above)  Physical Exam  Vitals signs reviewed  Constitutional:       General: She is not in acute distress  Appearance: She is well-developed  She is not diaphoretic  Comments: Chronically ill-appearing, cachectic   HENT:      Head: Normocephalic and atraumatic  Comments: Skin tear on left forehead closed by steristrips      Right Ear: External ear normal       Left Ear: External ear normal       Nose: Nose normal       Mouth/Throat:      Pharynx: No oropharyngeal exudate  Eyes:      Pupils: Pupils are equal, round, and reactive to light  Neck:      Musculoskeletal: Normal range of motion  Cardiovascular:      Rate and Rhythm: Normal rate and regular rhythm  Heart sounds: Normal heart sounds  No murmur  No friction rub  No gallop  Pulmonary:      Effort: Pulmonary effort is normal  No respiratory distress  Breath sounds: Normal breath sounds  No wheezing  Chest:      Chest wall: No tenderness  Abdominal:      General: Bowel sounds are normal  There is no distension  Palpations: Abdomen is soft  There is no mass  Tenderness: There is no abdominal tenderness  There is no guarding  Musculoskeletal: Normal range of motion  General: No deformity  Comments:  In cervical collar, no C/T/L spine tenderness    Lymphadenopathy:      Cervical: No cervical adenopathy  Skin:     General: Skin is warm and dry  Capillary Refill: Capillary refill takes less than 2 seconds  Neurological:      Mental Status: She is alert and oriented to person, place, and time  Comments: Oriented only to self  Moving all extremities equally          Invasive Devices     None                 Lab Results: Results: I have personally reviewed pertinent reports  Imaging/EKG Studies: Results: I have personally reviewed pertinent reports  Trauma - Ct Head Wo Contrast    Result Date: 1/8/2021  Impression: 1  Severely limited examination due to patient motion artifact  2   Cerebral atrophy with chronic small vessel ischemic white matter disease  3   Vague, layering increased density along the tentorium on the right, extending into the posterior interhemispheric fissure  Although the findings may be artifactual and related to patient motion artifact, right-sided subdural hemorrhage not excluded  If there is continued concern for acute intracranial injury, repeat examination with sedation is highly recommended  I personally discussed this study with Dr Eve Garrett on 1/8/2021 at 5:52 AM    Workstation performed: [id number]     Trauma - Ct Facial Bones Wo Contrast    Result Date: 1/8/2021  Impression: 1  Severely limited examination due to patient motion artifact as well as beam hardening artifact from dental hardware  2   No displaced facial bone fractures 3  Periapical lucencies surrounding the upper right central and lateral incisors as well as the posterior most right upper teeth  There is a bridge connecting the upper right teeth  There is breach of the bony cortex surrounding the roots, consistent with microfracture of the maxilla  Recommend follow-up OMS consultation and direct visualization  4   Periapical lucency surrounding the root of the lower left canine    No breech of the bony cortex  I personally discussed this study with Dr Milan Ramsay on 1/8/2021 at 5:52 AM  Workstation performed: [id number]     Trauma - Ct Spine Cervical Wo Contrast    Result Date: 1/8/2021  Impression: 1  Suboptimal examination due to patient motion artifact  2  Stable degenerative changes of the cervical spine  No acute cervical spine fracture or traumatic malalignment    I personally discussed this study with Dr Milan Ramsay on 1/8/2021 at 5:52 AM  Workstation performed: [id number]   Other Studies: N/A    Code Status: Prior  Advance Directive and Living Will:      Power of :    POLST:

## 2021-01-09 NOTE — TELEPHONE ENCOUNTER
[phone] Unit [address]/Noemi/Mao Villa/Johana Burkett/[birth year]/Patient has returned following hospital stay  Review orders

## 2021-01-20 ENCOUNTER — TELEPHONE (OUTPATIENT)
Dept: NEUROSURGERY | Facility: CLINIC | Age: 86
End: 2021-01-20

## 2021-01-20 NOTE — TELEPHONE ENCOUNTER
Patient was originally schedule for 1/22/21 w/Darell  Patient didn't have Head CT done  I spoke will [address] Gonzalez @ [facility] and we agreed to move appt to 1/27/21 and I will fax Head Ct orders and she will have imaging done before her appt @ [facility]) facility  Order Faxed to [phone]

## 2021-02-05 ENCOUNTER — NURSING HOME VISIT (OUTPATIENT)
Dept: GERIATRICS | Facility: OTHER | Age: 86
End: 2021-02-05
Payer: COMMERCIAL

## 2021-02-05 VITALS
RESPIRATION RATE: 18 BRPM | HEART RATE: 65 BPM | BODY MASS INDEX: 17.68 KG/M2 | SYSTOLIC BLOOD PRESSURE: 118 MMHG | OXYGEN SATURATION: 98 % | DIASTOLIC BLOOD PRESSURE: 62 MMHG | WEIGHT: 103 LBS | TEMPERATURE: 97 F

## 2021-02-05 DIAGNOSIS — F02.80 ALZHEIMER'S DEMENTIA WITHOUT BEHAVIORAL DISTURBANCE, UNSPECIFIED TIMING OF DEMENTIA ONSET (HCC): ICD-10-CM

## 2021-02-05 DIAGNOSIS — G30.9 ALZHEIMER'S DEMENTIA WITHOUT BEHAVIORAL DISTURBANCE, UNSPECIFIED TIMING OF DEMENTIA ONSET (HCC): ICD-10-CM

## 2021-02-05 DIAGNOSIS — S01.81XD FACIAL LACERATION, SUBSEQUENT ENCOUNTER: ICD-10-CM

## 2021-02-05 DIAGNOSIS — E78.49 OTHER HYPERLIPIDEMIA: ICD-10-CM

## 2021-02-05 DIAGNOSIS — E55.9 VITAMIN D DEFICIENCY: ICD-10-CM

## 2021-02-05 DIAGNOSIS — I10 ESSENTIAL HYPERTENSION: Primary | ICD-10-CM

## 2021-02-05 DIAGNOSIS — S06.5X9A SDH (SUBDURAL HEMATOMA) (HCC): ICD-10-CM

## 2021-02-05 DIAGNOSIS — Z51.5 COMFORT MEASURES ONLY STATUS: ICD-10-CM

## 2021-02-05 PROCEDURE — 99309 SBSQ NF CARE MODERATE MDM 30: CPT | Performed by: INTERNAL MEDICINE

## 2021-02-06 NOTE — ASSESSMENT & PLAN NOTE
History of dementia with frequent falls  Patient was evaluated in ER on 01/08/2021 post fall  CT imaging revealed small subdural hematoma  Patient remains stable  Confused with no focal neurological changes  Continue supportive care  Pt additionally remains on quetiapine 25 mg b i d  along with lorazepam and prn morphine per hospice  recommendations  Consider stopping lorazepam if patient continues to have issues with falls  Continue to implement fall precautions    Patient currently remains under one-to-one observation

## 2021-02-06 NOTE — PROGRESS NOTES
[address]  [address]   [address] [place], [place]  49     Progress Note  Code SNF 31    Patient Location     [facility]    Reason for visit      follow-up dementia, falls, hypertension, history of subdural hematoma, hyperlipidemia, ambulatory dysfunction    Patients care was coordinated with nursing facility staff  Recent vitals, labs and [facility]  Past Medical, surgical, social, medication and allergy history and patients previous records reviewed  Problem List Items Addressed This Visit        Cardiovascular and Mediastinum    Essential hypertension - Primary      Blood pressure stable at 130/77  Patient remains on Lotrel 5-20 mg once            Nervous and Auditory    Alzheimer's disease ([facility] [address] )      History of dementia with frequent falls  Patient was evaluated in ER on 01/08/2021 post fall  CT imaging revealed small subdural hematoma  Patient remains stable  Confused with no focal neurological changes  Continue supportive care  Pt additionally remains on quetiapine 25 mg b i d  along with lorazepam and prn morphine per hospice  recommendations  Consider stopping lorazepam if patient continues to have issues with falls  Continue to implement fall precautions  Patient currently remains under one-to-one observation            Other    Other hyperlipidemia      Patient remains under hospice care therefore not on statin         Vitamin D deficiency      Last reported vitamin-D level was 41  Currently not on any supplements         Comfort measures only status      Patient remains under hospice care  Continue comfort care measures         Facial laceration      Sustained as a result of recent fall  Healed                   HPI      patient is being seen for a follow-up visit today  Care coordinated nursing staff  Patient currently remains under one-to-one observation    She has history of recurrent falls  Patient was evaluated  In ER on 01/8/21 post fall  CT head revealed small  Subdural hematoma 2 mm in maximal thickness without any mass effect  Patient remains confused  Fall precautions are in place  Patient remains under hospice service   Recent ED notes and neurosurgical recommendations were reviewed      Review of Systems   Constitutional: Positive for fatigue  Negative for chills and fever  HENT: Negative for nosebleeds and rhinorrhea  Eyes: Negative for discharge and redness  Respiratory: Negative for cough, shortness of breath, wheezing and stridor  Cardiovascular: Negative for leg swelling  Gastrointestinal: Negative for abdominal distention, diarrhea and vomiting  Genitourinary: Negative for hematuria  Musculoskeletal: Positive for gait problem  Negative for arthralgias and back pain  Skin: Negative for pallor  Neurological: Positive for weakness (Generalized)  Negative for tremors, seizures and syncope  History of recurrent falls   Psychiatric/Behavioral: Positive for confusion  Negative for agitation and behavioral problems         Past Medical History:   Diagnosis Date    Comfort measures only status 9/22/2020    Dementia ([facility] [address]     Hyperlipidemia     Hypertension        Past Surgical History:   Procedure Laterality Date    HYSTERECTOMY         Social History     Tobacco Use   Smoking Status Never Smoker   Smokeless Tobacco Never Used       Family History   Problem Relation Age of Onset    Breast cancer Daughter         No Known Allergies      Current Outpatient Medications:     acetaminophen (TYLENOL) 325 mg tablet, Take 650 mg by mouth every 6 (six) hours as needed for mild pain Pain and elevated temperature, Disp: , Rfl:     amLODIPine-benazepril (LOTREL 5-20) 5-20 MG per capsule, Take 1 capsule by mouth daily, Disp: , Rfl:     docusate sodium (COLACE) 100 mg capsule, Take 100 mg by mouth 2 (two) times a day, Disp: , Rfl:    LORazepam (ATIVAN) 2 mg/mL concentrated solution, Take 0 25 mL (0 5 mg total) by mouth daily at bedtime for 10 days And 0 25mL or 05 mg bid and  25 ml or  5 mg q8h pre anxiety, Disp: 2 5 mL, Rfl: 0    magnesium citrate (CITROMA) 1 745 g/30 mL oral solution, Take 120 mL by mouth once, Disp: , Rfl:     magnesium hydroxide (MILK OF MAGNESIA) 400 mg/5 mL oral suspension, Take 5 mL by mouth daily as needed for constipation, Disp: , Rfl:     Melatonin 5 MG CAPS, Take 5 mg by mouth daily at bedtime, Disp: , Rfl:     trolamine salicylate (Aspercreme/Aloe) 10 % cream, Apply topically as needed for muscle/joint pain, Disp: , Rfl:     Updated list was reviewed in [facility] system of facility  Vitals:    02/05/21 2133   BP: 118/62   Pulse: 65   Resp: 18   Temp: (!) 97 °F (36 1 °C)   SpO2: 98%       Physical Exam  Constitutional:       General: She is not in acute distress  Appearance: She is well-developed  She is not diaphoretic  HENT:      Head: Normocephalic and atraumatic  Eyes:      General:         Right eye: No discharge  Left eye: No discharge  Cardiovascular:      Rate and Rhythm: Normal rate and regular rhythm  Pulmonary:      Effort: No respiratory distress  Breath sounds: No stridor  No wheezing  Abdominal:      General: There is no distension  Tenderness: There is no abdominal tenderness  There is no guarding  Skin:     Coloration: Skin is not jaundiced or pale  Neurological:      General: No focal deficit present  Mental Status: She is alert  She is disoriented  Psychiatric:         Mood and Affect: Mood normal          Diagnostic Data:     Previous labs were reviewed     labs done on 11/09/2020 revealed BUN of 11, creatinine 0 81, sodium 140, potassium 4 3, calcium 10 8, hemoglobin 13 1, WBC count 7 7    Additional Notes:    continue supportive treatment and comfort care measures only   discontinue aspirin    This note was electronically signed by Dr Yesy Hdez Antoni

## 2021-02-06 NOTE — ASSESSMENT & PLAN NOTE
History of recent SDH revealed by CT image post fall  Patient is noted to be on aspirin 81 mg daily    Will discontinue

## 2021-03-06 ENCOUNTER — APPOINTMENT (EMERGENCY)
Dept: CT IMAGING | Facility: HOSPITAL | Age: 86
End: 2021-03-06
Payer: MEDICARE

## 2021-03-06 ENCOUNTER — HOSPITAL ENCOUNTER (EMERGENCY)
Facility: HOSPITAL | Age: 86
Discharge: HOME | End: 2021-03-06
Payer: MEDICARE

## 2021-03-06 VITALS
TEMPERATURE: 97.8 F | HEART RATE: 84 BPM | SYSTOLIC BLOOD PRESSURE: 165 MMHG | RESPIRATION RATE: 20 BRPM | DIASTOLIC BLOOD PRESSURE: 125 MMHG | OXYGEN SATURATION: 97 %

## 2021-03-06 DIAGNOSIS — S09.90XA INJURY OF HEAD, INITIAL ENCOUNTER: ICD-10-CM

## 2021-03-06 DIAGNOSIS — S02.2XXA CLOSED FRACTURE OF NASAL BONE, INITIAL ENCOUNTER: Primary | ICD-10-CM

## 2021-03-06 PROCEDURE — 99284 EMERGENCY DEPT VISIT MOD MDM: CPT

## 2021-03-06 PROCEDURE — G1004 CDSM NDSC: HCPCS

## 2021-03-06 PROCEDURE — 96372 THER/PROPH/DIAG INJ SC/IM: CPT

## 2021-03-06 PROCEDURE — 70486 CT MAXILLOFACIAL W/O DYE: CPT

## 2021-03-06 PROCEDURE — 12011 RPR F/E/E/N/L/M 2.5 CM/<: CPT

## 2021-03-06 PROCEDURE — 99285 EMERGENCY DEPT VISIT HI MDM: CPT

## 2021-03-06 PROCEDURE — 70450 CT HEAD/BRAIN W/O DYE: CPT

## 2021-03-06 PROCEDURE — 72125 CT NECK SPINE W/O DYE: CPT

## 2021-03-06 RX ORDER — OLANZAPINE 10 MG/1
5 INJECTION, POWDER, LYOPHILIZED, FOR SOLUTION INTRAMUSCULAR ONCE
Status: COMPLETED | OUTPATIENT
Start: 2021-03-06 | End: 2021-03-06

## 2021-03-06 RX ORDER — LORAZEPAM 2 MG/ML
1 INJECTION INTRAMUSCULAR ONCE
Status: COMPLETED | OUTPATIENT
Start: 2021-03-06 | End: 2021-03-06

## 2021-03-06 RX ADMIN — WATER 1 ML: 1 INJECTION INTRAMUSCULAR; INTRAVENOUS; SUBCUTANEOUS at 12:28

## 2021-03-06 RX ADMIN — LORAZEPAM 1 MG: 2 INJECTION INTRAMUSCULAR; INTRAVENOUS at 12:26

## 2021-03-06 RX ADMIN — OLANZAPINE 5 MG: 10 INJECTION, POWDER, FOR SOLUTION INTRAMUSCULAR at 12:26

## 2021-03-06 NOTE — ED PROVIDER NOTES
History  Chief Complaint   Patient presents with    Head Injury     pt from [facility] fell out of wheelchair and struck forehead; EMS denies thinners or LOC     22-year-old female with significant history of dementia presents from nursing facility secondary to falling out her wheelchair hitting her face  Patient has swelling over the mid face nose region also has a laceration over the bridge of the nose  Patient does not take any blood thinners is no reported loss of consciousness  Due to patient's dementia and her baseline mental status unable to really get an assessment of patient's symptoms or possible neurological symptoms  Prior to Admission Medications   Prescriptions Last Dose Informant Patient Reported? Taking?    LORazepam (ATIVAN) 2 mg/mL concentrated solution   No No   Sig: Take 0 25 mL (0 5 mg total) by mouth daily at bedtime for 10 days And 0 25mL or 05 mg bid and  25 ml or  5 mg q8h pre anxiety   Melatonin 5 MG CAPS   Yes No   Sig: Take 5 mg by mouth daily at bedtime   acetaminophen (TYLENOL) 325 mg tablet   Yes No   Sig: Take 650 mg by mouth every 6 (six) hours as needed for mild pain Pain and elevated temperature   amLODIPine-benazepril (LOTREL 5-20) 5-20 MG per capsule   Yes No   Sig: Take 1 capsule by mouth daily   docusate sodium (COLACE) 100 mg capsule   Yes No   Sig: Take 100 mg by mouth 2 (two) times a day   magnesium citrate (CITROMA) 1 745 g/30 mL oral solution   Yes No   Sig: Take 120 mL by mouth once   magnesium hydroxide (MILK OF MAGNESIA) 400 mg/5 mL oral suspension   Yes No   Sig: Take 5 mL by mouth daily as needed for constipation   trolamine salicylate (Aspercreme/Aloe) 10 % cream   Yes No   Sig: Apply topically as needed for muscle/joint pain      Facility-Administered Medications: None       Past Medical History:   Diagnosis Date    Comfort measures only status 9/22/2020    Dementia ([facility] [address] )     Hyperlipidemia     Hypertension        Past Surgical History:   Procedure Laterality Date    HYSTERECTOMY         Family History   Problem Relation Age of Onset    Breast cancer Daughter      I have reviewed and agree with the history as documented  E-Cigarette/Vaping    E-Cigarette Use Never User      E-Cigarette/Vaping Substances     Social History     Tobacco Use    Smoking status: Never Smoker    Smokeless tobacco: Never Used   Substance Use Topics    Alcohol use: Never     Frequency: Never    Drug use: Never       Review of Systems   Constitutional: Negative for chills and fever  HENT: Negative for congestion  Eyes: Negative for visual disturbance  Respiratory: Negative for shortness of breath  Cardiovascular: Negative for chest pain  Gastrointestinal: Negative for abdominal pain  Endocrine: Negative for cold intolerance  Genitourinary: Negative for frequency  Musculoskeletal: Negative for gait problem  Skin: Positive for color change (bilateral legs) and wound  Negative for rash  Neurological: Negative for dizziness  Psychiatric/Behavioral: Positive for behavioral problems and confusion  Physical Exam  Physical Exam  Vitals signs and nursing note reviewed  Constitutional:       Appearance: She is well-developed  HENT:      Head: Normocephalic  Comments: Patient has significant swelling mid face region has a 1 cm laceration over the bridge of the nose  Patient has hematoma and bruising mid face  Right Ear: Tympanic membrane normal       Left Ear: Tympanic membrane normal       Nose:      Comments: Laceration over the bridge of the nose  Mouth/Throat:      Mouth: Mucous membranes are moist    Eyes:      Pupils: Pupils are equal, round, and reactive to light  Neck:      Comments: No deformity noted  Cardiovascular:      Rate and Rhythm: Normal rate and regular rhythm  Heart sounds: Normal heart sounds  Pulmonary:      Effort: Pulmonary effort is normal       Breath sounds: Normal breath sounds     Abdominal: General: Bowel sounds are normal       Palpations: Abdomen is soft  Musculoskeletal: Normal range of motion  Skin:     General: Skin is warm and dry  Capillary Refill: Capillary refill takes less than 2 seconds  Neurological:      Mental Status: She is alert  Mental status is at baseline  She is disoriented  Psychiatric:         Behavior: Behavior normal          Vital Signs  ED Triage Vitals   Temperature Pulse Respirations Blood Pressure SpO2   03/06/21 1213 03/06/21 1213 03/06/21 1213 03/06/21 1215 03/06/21 1213   97 8 °F (36 6 °C) 84 20 (!) 165/125 97 %      Temp Source Heart Rate Source Patient Position - Orthostatic VS BP Location FiO2 (%)   03/06/21 1213 03/06/21 1213 03/06/21 1213 03/06/21 1213 --   Oral Monitor Sitting Left arm       Pain Score       --                  Vitals:    03/06/21 1213 03/06/21 1215   BP:  (!) 165/125   Pulse: 84    Patient Position - Orthostatic VS: Sitting          Visual Acuity      ED Medications  Medications   LORazepam (ATIVAN) injection 1 mg (1 mg Intramuscular Given 3/6/21 1226)   OLANZapine (ZyPREXA) IM injection 5 mg (5 mg Intramuscular Given 3/6/21 1226)   sterile water injection **ADS Override Pull** (1 mL  Given 3/6/21 1228)       Diagnostic Studies  Results Reviewed     None                 CT head without contrast   Final Result by Laisha Hughes MD (03/06 1333)      Right frontal scalp hematoma without an underlying calvarial fracture or area of intracranial hemorrhage  Displaced nasal bone fractures  See the CT facial bone report                  Workstation performed: [id number]         CT spine cervical without contrast   Final Result by Steven Gomez MD (03/06 4976)      No acute cervical spine fracture or traumatic malalignment  Severe degenerative findings unchanged when compared with 2021               Workstation performed: [id number]         CT facial bones without contrast   Final Result by Steven Gomez MD (03/06 1338)      Moderately limited by motion artifact  Acute nasal bone fractures  The study was marked in [facility] for immediate notification  Workstation performed: [id number]                    Procedures  Laceration repair    Date/Time: 3/6/2021 2:00 PM  Performed by: Sharda Ball MD  Authorized by: Sharda Ball MD   Consent: Verbal consent not obtained  Patient consent: the patient's understanding of the procedure does not match consent given  Procedure consent: procedure consent does not match procedure scheduled  Relevant documents: relevant documents not present or verified  Test results: test results not available  Site marked: the operative site was marked  Radiology Images displayed and confirmed  If images not available, report reviewed: imaging studies available  Patient identity confirmed: anonymous protocol, patient vented/unresponsive  Time out: Immediately prior to procedure a "time out" was called to verify the correct patient, procedure, equipment, support staff and site/side marked as required  Body area: head/neck  Location details: nose  Laceration length: 2 cm  Foreign bodies: no foreign bodies  Tendon involvement: none  Nerve involvement: none  Vascular damage: no    Sedation:  Patient sedated: yes  Sedation type: anxiolysis  Sedatives: lorazepam  Sedation start date/time: 3/6/2021 12:00 PM      Wound Dehiscence:  Superficial Wound Dehiscence: simple closure      Procedure Details:  Irrigation solution: saline  Debridement: none  Degree of undermining: none  Skin closure: glue  Approximation: close  Approximation difficulty: simple               ED Course                             SBIRT 20yo+      Most Recent Value   SBIRT (22 yo +)   In order to provide better care to our patients, we are screening all of our patients for alcohol and drug use  Would it be okay to ask you these screening questions?   Yes Filed at: 03/06/2021 9103   Initial Alcohol Screen: US AUDIT-C    1  How often do you have a drink containing alcohol?  0 Filed at: 03/06/2021 1236   2  How many drinks containing alcohol do you have on a typical day you are drinking? 0 Filed at: 03/06/2021 1236   3a  Male UNDER 65: How often do you have five or more drinks on one occasion? 0 Filed at: 03/06/2021 1236   3b  FEMALE Any Age, or MALE 65+: How often do you have 4 or more drinks on one occassion? 0 Filed at: 03/06/2021 1236   Audit-C Score  0 Filed at: 03/06/2021 1236   DAMION: How many times in the past year have you    Used an illegal drug or used a prescription medication for non-medical reasons? Never Filed at: 03/06/2021 1236                    [facility]  Number of Diagnoses or Management Options  Diagnosis management comments: CT scan of head demonstrates no skull fracture no intracranial bleed patient does have what appears to be a minimally displaced nasal fracture on CT face  Bilateral fractures with slight position to the left  CT scan cervical spine no acute fracture  Patient had wound on nose cleaned and Dermabond placed  It was communication with nursing facility patient is a hospice patient with comfort measures only at this point  This is not a structural issue regarding her nasal fracture so at this point in time will be managed conservatively  Disposition  Final diagnoses:   Closed fracture of nasal bone, initial encounter   Injury of head, initial encounter     Time reflects when diagnosis was documented in both MDM as applicable and the Disposition within this note     Time User Action Codes Description Comment    3/6/2021  2:05 PM Prasad Pina Add [S02  2XXA] Closed fracture of nasal bone, initial encounter     3/6/2021  2:05 PM Prasad Pina Add [S09 90XA] Injury of head, initial encounter       ED Disposition     ED Disposition Condition Date/Time Comment    Discharge Stable Sat Mar 6, 2021  2:05 PM [facility] discharge to home/self care              Follow-up Information     Follow up With Specialties Details Why [address] Emergency Department Emergency Medicine Schedule an appointment as soon as possible for a visit in 2 days  [address] [facility],[address] [id number]  [address] [facility] Emergency Department, [address], [facility] [address] [facility] [address] [facility] [address]    Patients primary physician    in 2 days           Patient's Medications   Discharge Prescriptions    No medications on file     No discharge procedures on file      PDMP Review       Value Time User    PDMP Reviewed  Yes 1/8/2021  3:52 PM [address], [place]          ED Provider  Electronically Signed by           Aletha Spurling, MD  03/06/21 4919

## 2024-03-04 NOTE — ED NOTES
March 4, 2024     Patient: Haritha Coughlin   YOB: 1993   Date of Visit: 3/4/2024       To Whom it May Concern:    Haritha Coughlin was seen in my clinic on 3/4/2024 at 8:15 am.    Please excuse Haritha for her absence from work on the date listed above to be able to make her appointment.    Sincerely,         Lavern Hare MD    Medical information is confidential and cannot be disclosed without the written consent of the patient or her representative.       Pt back from [facility], [address] [place] [address]  07/29/19 [id number]

## 2025-06-14 NOTE — ASSESSMENT & PLAN NOTE
· Fasted lipid panel well within normal limits  · Continue home dose Lipitor History of alcohol use disorder